# Patient Record
Sex: FEMALE | Race: WHITE | Employment: OTHER | URBAN - NONMETROPOLITAN AREA
[De-identification: names, ages, dates, MRNs, and addresses within clinical notes are randomized per-mention and may not be internally consistent; named-entity substitution may affect disease eponyms.]

---

## 2017-11-16 ENCOUNTER — HOSPITAL ENCOUNTER (INPATIENT)
Age: 67
LOS: 3 days | Discharge: HOME | DRG: 292 | End: 2017-11-19
Attending: EMERGENCY MEDICINE | Admitting: FAMILY MEDICINE
Payer: MEDICARE

## 2017-11-16 ENCOUNTER — APPOINTMENT (OUTPATIENT)
Dept: CT IMAGING | Age: 67
DRG: 292 | End: 2017-11-16
Payer: MEDICARE

## 2017-11-16 ENCOUNTER — APPOINTMENT (OUTPATIENT)
Dept: GENERAL RADIOLOGY | Age: 67
DRG: 292 | End: 2017-11-16
Payer: MEDICARE

## 2017-11-16 DIAGNOSIS — I50.9 CONGESTIVE HEART FAILURE, UNSPECIFIED CONGESTIVE HEART FAILURE CHRONICITY, UNSPECIFIED CONGESTIVE HEART FAILURE TYPE: ICD-10-CM

## 2017-11-16 DIAGNOSIS — I48.91 NEW ONSET A-FIB (HCC): Primary | ICD-10-CM

## 2017-11-16 LAB
-: ABNORMAL
ABSOLUTE EOS #: 0.3 K/UL (ref 0–0.4)
ABSOLUTE IMMATURE GRANULOCYTE: ABNORMAL K/UL (ref 0–0.3)
ABSOLUTE LYMPH #: 2.2 K/UL (ref 1–4.8)
ABSOLUTE MONO #: 0.7 K/UL (ref 0–1)
ALBUMIN SERPL-MCNC: 3.7 G/DL (ref 3.5–5.2)
ALBUMIN/GLOBULIN RATIO: 0.9 (ref 1–2.5)
ALP BLD-CCNC: 100 U/L (ref 35–104)
ALT SERPL-CCNC: 18 U/L (ref 5–33)
AMORPHOUS: ABNORMAL
ANION GAP SERPL CALCULATED.3IONS-SCNC: 11 MMOL/L (ref 9–17)
AST SERPL-CCNC: 20 U/L
BACTERIA: ABNORMAL
BASOPHILS # BLD: 1 %
BASOPHILS ABSOLUTE: 0.1 K/UL (ref 0–0.2)
BILIRUB SERPL-MCNC: 0.45 MG/DL (ref 0.3–1.2)
BILIRUBIN URINE: NEGATIVE
BNP INTERPRETATION: ABNORMAL
BUN BLDV-MCNC: 24 MG/DL (ref 8–23)
BUN/CREAT BLD: 26 (ref 9–20)
CALCIUM SERPL-MCNC: 9 MG/DL (ref 8.6–10.4)
CARBAMAZEPINE DATE LAST DOSE: ABNORMAL
CARBAMAZEPINE DOSE AMOUNT: ABNORMAL
CARBAMAZEPINE DOSE TIME: ABNORMAL
CARBAMAZEPINE LEVEL: 7.7 UG/ML (ref 8–12)
CASTS UA: ABNORMAL /LPF
CHLORIDE BLD-SCNC: 93 MMOL/L (ref 98–107)
CO2: 29 MMOL/L (ref 20–31)
COLOR: YELLOW
COMMENT UA: ABNORMAL
CREAT SERPL-MCNC: 0.93 MG/DL (ref 0.5–0.9)
CRYSTALS, UA: ABNORMAL /HPF
DIFFERENTIAL TYPE: ABNORMAL
EKG ATRIAL RATE: 64 BPM
EKG Q-T INTERVAL: 446 MS
EKG QRS DURATION: 82 MS
EKG QTC CALCULATION (BAZETT): 452 MS
EKG R AXIS: 71 DEGREES
EKG T AXIS: 92 DEGREES
EKG VENTRICULAR RATE: 62 BPM
EOSINOPHILS RELATIVE PERCENT: 3 %
EPITHELIAL CELLS UA: ABNORMAL /HPF (ref 0–25)
GFR AFRICAN AMERICAN: >60 ML/MIN
GFR NON-AFRICAN AMERICAN: 60 ML/MIN
GFR SERPL CREATININE-BSD FRML MDRD: ABNORMAL ML/MIN/{1.73_M2}
GFR SERPL CREATININE-BSD FRML MDRD: ABNORMAL ML/MIN/{1.73_M2}
GLUCOSE BLD-MCNC: 85 MG/DL (ref 70–99)
GLUCOSE URINE: NEGATIVE
HCT VFR BLD CALC: 38 % (ref 36–46)
HEMOGLOBIN: 12.4 G/DL (ref 12–16)
IMMATURE GRANULOCYTES: ABNORMAL %
KETONES, URINE: NEGATIVE
LEUKOCYTE ESTERASE, URINE: NEGATIVE
LYMPHOCYTES # BLD: 21 %
MCH RBC QN AUTO: 27.6 PG (ref 26–34)
MCHC RBC AUTO-ENTMCNC: 32.8 G/DL (ref 31–37)
MCV RBC AUTO: 84.4 FL (ref 80–100)
MONOCYTES # BLD: 7 %
MUCUS: ABNORMAL
NITRITE, URINE: NEGATIVE
OTHER OBSERVATIONS UA: ABNORMAL
PDW BLD-RTO: 16.7 % (ref 12.1–15.2)
PH UA: 6 (ref 5–9)
PLATELET # BLD: 152 K/UL (ref 140–450)
PLATELET ESTIMATE: ABNORMAL
PMV BLD AUTO: 9.4 FL (ref 6–12)
POTASSIUM SERPL-SCNC: 4.8 MMOL/L (ref 3.7–5.3)
PRO-BNP: 1059 PG/ML
PROTEIN UA: NEGATIVE
RBC # BLD: 4.5 M/UL (ref 4–5.2)
RBC # BLD: ABNORMAL 10*6/UL
RBC UA: ABNORMAL /HPF (ref 0–2)
RENAL EPITHELIAL, UA: ABNORMAL /HPF
SEG NEUTROPHILS: 68 %
SEGMENTED NEUTROPHILS ABSOLUTE COUNT: 7 K/UL (ref 1.8–7.7)
SODIUM BLD-SCNC: 133 MMOL/L (ref 135–144)
SPECIFIC GRAVITY UA: 1.01 (ref 1.01–1.02)
TOTAL PROTEIN: 7.6 G/DL (ref 6.4–8.3)
TRICHOMONAS: ABNORMAL
TROPONIN INTERP: NORMAL
TROPONIN T: <0.03 NG/ML
TURBIDITY: CLEAR
URINE HGB: ABNORMAL
UROBILINOGEN, URINE: NORMAL
WBC # BLD: 10.3 K/UL (ref 3.5–11)
WBC # BLD: ABNORMAL 10*3/UL
WBC UA: ABNORMAL /HPF (ref 0–5)
YEAST: ABNORMAL

## 2017-11-16 PROCEDURE — 1200000000 HC SEMI PRIVATE

## 2017-11-16 PROCEDURE — 85025 COMPLETE CBC W/AUTO DIFF WBC: CPT

## 2017-11-16 PROCEDURE — 6370000000 HC RX 637 (ALT 250 FOR IP): Performed by: EMERGENCY MEDICINE

## 2017-11-16 PROCEDURE — 84443 ASSAY THYROID STIM HORMONE: CPT

## 2017-11-16 PROCEDURE — 83880 ASSAY OF NATRIURETIC PEPTIDE: CPT

## 2017-11-16 PROCEDURE — 83036 HEMOGLOBIN GLYCOSYLATED A1C: CPT

## 2017-11-16 PROCEDURE — 96365 THER/PROPH/DIAG IV INF INIT: CPT

## 2017-11-16 PROCEDURE — 84484 ASSAY OF TROPONIN QUANT: CPT

## 2017-11-16 PROCEDURE — 6360000002 HC RX W HCPCS: Performed by: EMERGENCY MEDICINE

## 2017-11-16 PROCEDURE — 96375 TX/PRO/DX INJ NEW DRUG ADDON: CPT

## 2017-11-16 PROCEDURE — 82947 ASSAY GLUCOSE BLOOD QUANT: CPT

## 2017-11-16 PROCEDURE — 6360000004 HC RX CONTRAST MEDICATION: Performed by: EMERGENCY MEDICINE

## 2017-11-16 PROCEDURE — 81001 URINALYSIS AUTO W/SCOPE: CPT

## 2017-11-16 PROCEDURE — 71020 XR CHEST STANDARD TWO VW: CPT

## 2017-11-16 PROCEDURE — 71275 CT ANGIOGRAPHY CHEST: CPT

## 2017-11-16 PROCEDURE — 99285 EMERGENCY DEPT VISIT HI MDM: CPT

## 2017-11-16 PROCEDURE — 80053 COMPREHEN METABOLIC PANEL: CPT

## 2017-11-16 PROCEDURE — 96372 THER/PROPH/DIAG INJ SC/IM: CPT

## 2017-11-16 PROCEDURE — 2580000003 HC RX 258: Performed by: EMERGENCY MEDICINE

## 2017-11-16 PROCEDURE — 93005 ELECTROCARDIOGRAM TRACING: CPT

## 2017-11-16 PROCEDURE — 84439 ASSAY OF FREE THYROXINE: CPT

## 2017-11-16 PROCEDURE — 80156 ASSAY CARBAMAZEPINE TOTAL: CPT

## 2017-11-16 PROCEDURE — 73030 X-RAY EXAM OF SHOULDER: CPT

## 2017-11-16 RX ORDER — OXYCODONE HYDROCHLORIDE AND ACETAMINOPHEN 5; 325 MG/1; MG/1
1 TABLET ORAL ONCE
Status: COMPLETED | OUTPATIENT
Start: 2017-11-16 | End: 2017-11-16

## 2017-11-16 RX ORDER — ONDANSETRON 2 MG/ML
4 INJECTION INTRAMUSCULAR; INTRAVENOUS EVERY 6 HOURS PRN
Status: DISCONTINUED | OUTPATIENT
Start: 2017-11-16 | End: 2017-11-19 | Stop reason: HOSPADM

## 2017-11-16 RX ORDER — FUROSEMIDE 10 MG/ML
40 INJECTION INTRAMUSCULAR; INTRAVENOUS 2 TIMES DAILY
Status: DISCONTINUED | OUTPATIENT
Start: 2017-11-16 | End: 2017-11-19 | Stop reason: HOSPADM

## 2017-11-16 RX ORDER — CARBAMAZEPINE 100 MG/1
200 TABLET, EXTENDED RELEASE ORAL 2 TIMES DAILY
Status: DISCONTINUED | OUTPATIENT
Start: 2017-11-16 | End: 2017-11-19 | Stop reason: HOSPADM

## 2017-11-16 RX ORDER — FLUCONAZOLE 100 MG/1
200 TABLET ORAL ONCE
Status: COMPLETED | OUTPATIENT
Start: 2017-11-16 | End: 2017-11-16

## 2017-11-16 RX ORDER — DEXTROSE MONOHYDRATE 50 MG/ML
100 INJECTION, SOLUTION INTRAVENOUS PRN
Status: DISCONTINUED | OUTPATIENT
Start: 2017-11-16 | End: 2017-11-19 | Stop reason: HOSPADM

## 2017-11-16 RX ORDER — DEXTROSE MONOHYDRATE 25 G/50ML
12.5 INJECTION, SOLUTION INTRAVENOUS PRN
Status: DISCONTINUED | OUTPATIENT
Start: 2017-11-16 | End: 2017-11-19 | Stop reason: HOSPADM

## 2017-11-16 RX ORDER — SODIUM CHLORIDE 0.9 % (FLUSH) 0.9 %
10 SYRINGE (ML) INJECTION EVERY 12 HOURS SCHEDULED
Status: DISCONTINUED | OUTPATIENT
Start: 2017-11-16 | End: 2017-11-19 | Stop reason: HOSPADM

## 2017-11-16 RX ORDER — ACETAMINOPHEN 325 MG/1
650 TABLET ORAL EVERY 4 HOURS PRN
Status: DISCONTINUED | OUTPATIENT
Start: 2017-11-16 | End: 2017-11-19 | Stop reason: HOSPADM

## 2017-11-16 RX ORDER — ALBUTEROL SULFATE 90 UG/1
2 AEROSOL, METERED RESPIRATORY (INHALATION) EVERY 6 HOURS PRN
Status: DISCONTINUED | OUTPATIENT
Start: 2017-11-16 | End: 2017-11-19 | Stop reason: HOSPADM

## 2017-11-16 RX ORDER — NAPROXEN 500 MG/1
500 TABLET ORAL 2 TIMES DAILY PRN
Status: ON HOLD | COMMUNITY
End: 2017-11-19 | Stop reason: HOSPADM

## 2017-11-16 RX ORDER — FUROSEMIDE 20 MG/1
20 TABLET ORAL 2 TIMES DAILY
COMMUNITY

## 2017-11-16 RX ORDER — FUROSEMIDE 10 MG/ML
40 INJECTION INTRAMUSCULAR; INTRAVENOUS ONCE
Status: COMPLETED | OUTPATIENT
Start: 2017-11-16 | End: 2017-11-16

## 2017-11-16 RX ORDER — METOPROLOL TARTRATE 50 MG/1
50 TABLET, FILM COATED ORAL 2 TIMES DAILY
Status: DISCONTINUED | OUTPATIENT
Start: 2017-11-16 | End: 2017-11-19 | Stop reason: HOSPADM

## 2017-11-16 RX ORDER — NICOTINE POLACRILEX 4 MG
15 LOZENGE BUCCAL PRN
Status: DISCONTINUED | OUTPATIENT
Start: 2017-11-16 | End: 2017-11-19 | Stop reason: HOSPADM

## 2017-11-16 RX ORDER — FAMOTIDINE 20 MG/1
20 TABLET, FILM COATED ORAL 2 TIMES DAILY
Status: DISCONTINUED | OUTPATIENT
Start: 2017-11-16 | End: 2017-11-19 | Stop reason: HOSPADM

## 2017-11-16 RX ORDER — SODIUM CHLORIDE 0.9 % (FLUSH) 0.9 %
10 SYRINGE (ML) INJECTION PRN
Status: DISCONTINUED | OUTPATIENT
Start: 2017-11-16 | End: 2017-11-19 | Stop reason: HOSPADM

## 2017-11-16 RX ORDER — ALBUTEROL SULFATE 90 UG/1
2 AEROSOL, METERED RESPIRATORY (INHALATION) EVERY 6 HOURS PRN
COMMUNITY

## 2017-11-16 RX ORDER — GLIMEPIRIDE 4 MG/1
4 TABLET ORAL
COMMUNITY

## 2017-11-16 RX ORDER — BISOPROLOL FUMARATE AND HYDROCHLOROTHIAZIDE 10; 6.25 MG/1; MG/1
1 TABLET ORAL DAILY
COMMUNITY

## 2017-11-16 RX ORDER — GLIMEPIRIDE 4 MG/1
4 TABLET ORAL
Status: DISCONTINUED | OUTPATIENT
Start: 2017-11-17 | End: 2017-11-19 | Stop reason: HOSPADM

## 2017-11-16 RX ORDER — METOPROLOL TARTRATE 5 MG/5ML
5 INJECTION INTRAVENOUS EVERY 6 HOURS PRN
Status: DISCONTINUED | OUTPATIENT
Start: 2017-11-16 | End: 2017-11-19 | Stop reason: HOSPADM

## 2017-11-16 RX ORDER — MORPHINE SULFATE 2 MG/ML
4 INJECTION, SOLUTION INTRAMUSCULAR; INTRAVENOUS ONCE
Status: COMPLETED | OUTPATIENT
Start: 2017-11-16 | End: 2017-11-16

## 2017-11-16 RX ORDER — CARBAMAZEPINE 200 MG/1
200 TABLET, EXTENDED RELEASE ORAL 2 TIMES DAILY
COMMUNITY

## 2017-11-16 RX ADMIN — OXYCODONE HYDROCHLORIDE AND ACETAMINOPHEN 1 TABLET: 5; 325 TABLET ORAL at 20:24

## 2017-11-16 RX ADMIN — IOPAMIDOL 100 ML: 612 INJECTION, SOLUTION INTRAVENOUS at 19:28

## 2017-11-16 RX ADMIN — FUROSEMIDE 40 MG: 10 INJECTION, SOLUTION INTRAMUSCULAR; INTRAVENOUS at 21:57

## 2017-11-16 RX ADMIN — VANCOMYCIN HYDROCHLORIDE 1000 MG: 1 INJECTION, POWDER, LYOPHILIZED, FOR SOLUTION INTRAVENOUS at 19:40

## 2017-11-16 RX ADMIN — FLUCONAZOLE 200 MG: 100 TABLET ORAL at 18:20

## 2017-11-16 RX ADMIN — MORPHINE SULFATE 4 MG: 2 INJECTION, SOLUTION INTRAMUSCULAR; INTRAVENOUS at 18:49

## 2017-11-16 ASSESSMENT — PAIN DESCRIPTION - ONSET: ONSET: AWAKENED FROM SLEEP

## 2017-11-16 ASSESSMENT — PAIN DESCRIPTION - PAIN TYPE
TYPE: ACUTE PAIN
TYPE: CHRONIC PAIN

## 2017-11-16 ASSESSMENT — ENCOUNTER SYMPTOMS
ABDOMINAL PAIN: 0
DIARRHEA: 0
COUGH: 0
FACIAL SWELLING: 0
SORE THROAT: 0
TROUBLE SWALLOWING: 0
VOMITING: 0
NAUSEA: 0
BACK PAIN: 0
VOICE CHANGE: 0
CHEST TIGHTNESS: 0
PHOTOPHOBIA: 0
BLOOD IN STOOL: 0
WHEEZING: 0
SHORTNESS OF BREATH: 1

## 2017-11-16 ASSESSMENT — PAIN DESCRIPTION - DESCRIPTORS
DESCRIPTORS: ACHING;DISCOMFORT
DESCRIPTORS: ACHING;CONSTANT;CRAMPING

## 2017-11-16 ASSESSMENT — PAIN DESCRIPTION - FREQUENCY
FREQUENCY: CONTINUOUS
FREQUENCY: CONTINUOUS

## 2017-11-16 ASSESSMENT — PAIN DESCRIPTION - ORIENTATION
ORIENTATION: RIGHT
ORIENTATION: RIGHT

## 2017-11-16 ASSESSMENT — PAIN DESCRIPTION - LOCATION
LOCATION: SHOULDER
LOCATION: SHOULDER

## 2017-11-16 ASSESSMENT — PAIN SCALES - GENERAL
PAINLEVEL_OUTOF10: 7
PAINLEVEL_OUTOF10: 4
PAINLEVEL_OUTOF10: 8
PAINLEVEL_OUTOF10: 4

## 2017-11-16 NOTE — ED PROVIDER NOTES
deficit. She exhibits normal muscle tone. Coordination normal.   No nystagmus   Skin: Skin is warm and dry. No rash noted. She is not diaphoretic. No erythema. No pallor. Psychiatric: She has a normal mood and affect. Her behavior is normal. Thought content normal.   Nursing note and vitals reviewed. DIAGNOSTIC RESULTS     EKG: (none if blank)  All EKG's are interpreted by the Emergency Department Physician who either signs or Co-signs this chart in the absence of a cardiologist.      RADIOLOGY: (none if blank)   Interpretation per the Radiologist below, if available at the time of this note:    XR CHEST STANDARD (2 VW)    (Results Pending)   CTA CHEST W CONTRAST    (Results Pending)   XR SHOULDER RIGHT (MIN 2 VIEWS)    (Results Pending)       LABS:  Labs Reviewed   CBC WITH AUTO DIFFERENTIAL   COMPREHENSIVE METABOLIC PANEL   UA W/REFLEX CULTURE   TROPONIN   CARBAMAZEPINE LEVEL, TOTAL       All other labs were within normal range or not returned as of this dictation. EMERGENCY DEPARTMENT COURSE and Medical Decision Making:     MDM/  ED Course      Pt has new onset Afib, likely the source of her dyspnea. Will require admission for further eavluation. Case signed out to Dr Jeff Shafer at 1900hr shift change for follow up of CTA  I discussed case preliminarily with Dr Kayla Levi to let him know of the pt presentaiont    CONSULTS: (None if blank)  None    Procedures: (None if blank)       CLINICAL IMPRESSION    No diagnosis found. DISPOSITION/PLAN   DISPOSITION     PATIENT REFERRED TO:  No follow-up provider specified.     DISCHARGE MEDICATIONS:  New Prescriptions    No medications on file              (Please note that portions of this note were completed with a voice recognition program.  Efforts were made to edit the dictations but occasionally words are mis-transcribed.)      Gianfranco Hall DO, JANETT (electronically signed)  Attending Emergency Physician         Pattie Blanco DO  11/20/17 1117

## 2017-11-17 ENCOUNTER — APPOINTMENT (OUTPATIENT)
Dept: ULTRASOUND IMAGING | Age: 67
DRG: 292 | End: 2017-11-17
Payer: MEDICARE

## 2017-11-17 PROBLEM — I10 ESSENTIAL HYPERTENSION: Status: ACTIVE | Noted: 2017-11-16

## 2017-11-17 PROBLEM — D68.00 VON WILLEBRAND DISEASE: Status: ACTIVE | Noted: 2017-11-17

## 2017-11-17 PROBLEM — I50.31 ACUTE DIASTOLIC HEART FAILURE (HCC): Status: ACTIVE | Noted: 2017-11-16

## 2017-11-17 LAB
ABSOLUTE EOS #: 0.2 K/UL (ref 0–0.4)
ABSOLUTE IMMATURE GRANULOCYTE: ABNORMAL K/UL (ref 0–0.3)
ABSOLUTE LYMPH #: 1.4 K/UL (ref 1–4.8)
ABSOLUTE MONO #: 0.5 K/UL (ref 0–1)
ALBUMIN SERPL-MCNC: 3.3 G/DL (ref 3.5–5.2)
ALBUMIN/GLOBULIN RATIO: 0.9 (ref 1–2.5)
ALP BLD-CCNC: 105 U/L (ref 35–104)
ALT SERPL-CCNC: 40 U/L (ref 5–33)
ANION GAP SERPL CALCULATED.3IONS-SCNC: 13 MMOL/L (ref 9–17)
AST SERPL-CCNC: 67 U/L
BASOPHILS # BLD: 0 %
BASOPHILS ABSOLUTE: 0 K/UL (ref 0–0.2)
BILIRUB SERPL-MCNC: 0.67 MG/DL (ref 0.3–1.2)
BNP INTERPRETATION: ABNORMAL
BUN BLDV-MCNC: 23 MG/DL (ref 8–23)
BUN/CREAT BLD: 26 (ref 9–20)
CALCIUM SERPL-MCNC: 8.8 MG/DL (ref 8.6–10.4)
CHLORIDE BLD-SCNC: 96 MMOL/L (ref 98–107)
CO2: 27 MMOL/L (ref 20–31)
COLLAGEN ADENOSINE-5'-DIPHOSPHATE (ADP) TIME: 91 SEC (ref 67–112)
COLLAGEN EPINEPHRINE TIME: 195 SEC (ref 85–172)
CREAT SERPL-MCNC: 0.89 MG/DL (ref 0.5–0.9)
DIFFERENTIAL TYPE: ABNORMAL
EOSINOPHILS RELATIVE PERCENT: 2 %
ESTIMATED AVERAGE GLUCOSE: 151 MG/DL
GFR AFRICAN AMERICAN: >60 ML/MIN
GFR NON-AFRICAN AMERICAN: >60 ML/MIN
GFR SERPL CREATININE-BSD FRML MDRD: ABNORMAL ML/MIN/{1.73_M2}
GFR SERPL CREATININE-BSD FRML MDRD: ABNORMAL ML/MIN/{1.73_M2}
GLUCOSE BLD-MCNC: 131 MG/DL (ref 74–100)
GLUCOSE BLD-MCNC: 136 MG/DL (ref 70–99)
GLUCOSE BLD-MCNC: 157 MG/DL (ref 74–100)
GLUCOSE BLD-MCNC: 167 MG/DL (ref 74–100)
GLUCOSE BLD-MCNC: 181 MG/DL (ref 74–100)
GLUCOSE BLD-MCNC: 251 MG/DL (ref 74–100)
GLUCOSE BLD-MCNC: 275 MG/DL (ref 74–100)
HBA1C MFR BLD: 6.9 % (ref 4.8–5.9)
HCT VFR BLD CALC: 36.7 % (ref 36–46)
HEMOGLOBIN: 11.8 G/DL (ref 12–16)
IMMATURE GRANULOCYTES: ABNORMAL %
LV EF: 55 %
LVEF MODALITY: NORMAL
LYMPHOCYTES # BLD: 18 %
MAGNESIUM: 2.1 MG/DL (ref 1.6–2.6)
MCH RBC QN AUTO: 27.5 PG (ref 26–34)
MCHC RBC AUTO-ENTMCNC: 32.2 G/DL (ref 31–37)
MCV RBC AUTO: 85.4 FL (ref 80–100)
MONOCYTES # BLD: 6 %
PDW BLD-RTO: 16.6 % (ref 12.1–15.2)
PLATELET # BLD: 134 K/UL (ref 140–450)
PLATELET ESTIMATE: ABNORMAL
PLATELET FUNCTION INTERP: ABNORMAL
PMV BLD AUTO: 10.5 FL (ref 6–12)
POTASSIUM SERPL-SCNC: 4.2 MMOL/L (ref 3.7–5.3)
PRO-BNP: 970 PG/ML
RBC # BLD: 4.29 M/UL (ref 4–5.2)
RBC # BLD: ABNORMAL 10*6/UL
SEG NEUTROPHILS: 74 %
SEGMENTED NEUTROPHILS ABSOLUTE COUNT: 5.6 K/UL (ref 1.8–7.7)
SODIUM BLD-SCNC: 136 MMOL/L (ref 135–144)
THYROXINE, FREE: 4.91 NG/DL (ref 0.93–1.7)
TOTAL PROTEIN: 6.9 G/DL (ref 6.4–8.3)
TROPONIN INTERP: NORMAL
TROPONIN T: <0.03 NG/ML
TSH SERPL DL<=0.05 MIU/L-ACNC: 2.43 MIU/L (ref 0.3–5)
WBC # BLD: 7.7 K/UL (ref 3.5–11)
WBC # BLD: ABNORMAL 10*3/UL

## 2017-11-17 PROCEDURE — 93306 TTE W/DOPPLER COMPLETE: CPT

## 2017-11-17 PROCEDURE — 6370000000 HC RX 637 (ALT 250 FOR IP): Performed by: FAMILY MEDICINE

## 2017-11-17 PROCEDURE — 2500000003 HC RX 250 WO HCPCS: Performed by: RADIOLOGY

## 2017-11-17 PROCEDURE — 6360000002 HC RX W HCPCS: Performed by: FAMILY MEDICINE

## 2017-11-17 PROCEDURE — 80053 COMPREHEN METABOLIC PANEL: CPT

## 2017-11-17 PROCEDURE — 85025 COMPLETE CBC W/AUTO DIFF WBC: CPT

## 2017-11-17 PROCEDURE — 99223 1ST HOSP IP/OBS HIGH 75: CPT | Performed by: FAMILY MEDICINE

## 2017-11-17 PROCEDURE — 94664 DEMO&/EVAL PT USE INHALER: CPT

## 2017-11-17 PROCEDURE — 83735 ASSAY OF MAGNESIUM: CPT

## 2017-11-17 PROCEDURE — 2580000003 HC RX 258: Performed by: FAMILY MEDICINE

## 2017-11-17 PROCEDURE — 85245 CLOT FACTOR VIII VW RISTOCTN: CPT

## 2017-11-17 PROCEDURE — 82947 ASSAY GLUCOSE BLOOD QUANT: CPT

## 2017-11-17 PROCEDURE — 85576 BLOOD PLATELET AGGREGATION: CPT

## 2017-11-17 PROCEDURE — 36415 COLL VENOUS BLD VENIPUNCTURE: CPT

## 2017-11-17 PROCEDURE — 1200000000 HC SEMI PRIVATE

## 2017-11-17 PROCEDURE — 94640 AIRWAY INHALATION TREATMENT: CPT

## 2017-11-17 PROCEDURE — 0W9B3ZZ DRAINAGE OF LEFT PLEURAL CAVITY, PERCUTANEOUS APPROACH: ICD-10-PCS | Performed by: RADIOLOGY

## 2017-11-17 PROCEDURE — 32555 ASPIRATE PLEURA W/ IMAGING: CPT

## 2017-11-17 PROCEDURE — 85246 CLOT FACTOR VIII VW ANTIGEN: CPT

## 2017-11-17 RX ORDER — ALPRAZOLAM 0.25 MG/1
0.25 TABLET ORAL
Status: DISCONTINUED | OUTPATIENT
Start: 2017-11-17 | End: 2017-11-19 | Stop reason: HOSPADM

## 2017-11-17 RX ORDER — KETOROLAC TROMETHAMINE 15 MG/ML
15 INJECTION, SOLUTION INTRAMUSCULAR; INTRAVENOUS ONCE
Status: COMPLETED | OUTPATIENT
Start: 2017-11-17 | End: 2017-11-17

## 2017-11-17 RX ORDER — ALPRAZOLAM 0.25 MG/1
0.25 TABLET ORAL
COMMUNITY

## 2017-11-17 RX ORDER — LIDOCAINE HYDROCHLORIDE 20 MG/ML
INJECTION, SOLUTION INFILTRATION; PERINEURAL
Status: COMPLETED | OUTPATIENT
Start: 2017-11-17 | End: 2017-11-17

## 2017-11-17 RX ADMIN — INSULIN LISPRO 1 UNITS: 100 INJECTION, SOLUTION INTRAVENOUS; SUBCUTANEOUS at 21:17

## 2017-11-17 RX ADMIN — GLIMEPIRIDE 4 MG: 4 TABLET ORAL at 08:01

## 2017-11-17 RX ADMIN — ALBUTEROL SULFATE 2 PUFF: 90 AEROSOL, METERED RESPIRATORY (INHALATION) at 11:42

## 2017-11-17 RX ADMIN — KETOROLAC TROMETHAMINE 15 MG: 15 INJECTION, SOLUTION INTRAMUSCULAR; INTRAVENOUS at 14:23

## 2017-11-17 RX ADMIN — FUROSEMIDE 40 MG: 10 INJECTION, SOLUTION INTRAMUSCULAR; INTRAVENOUS at 16:56

## 2017-11-17 RX ADMIN — ACETAMINOPHEN 650 MG: 325 TABLET, FILM COATED ORAL at 21:16

## 2017-11-17 RX ADMIN — Medication 10 ML: at 11:11

## 2017-11-17 RX ADMIN — SERTRALINE HYDROCHLORIDE 0.25 MG: 50 TABLET ORAL at 12:15

## 2017-11-17 RX ADMIN — METOPROLOL TARTRATE 50 MG: 50 TABLET ORAL at 11:10

## 2017-11-17 RX ADMIN — FAMOTIDINE 20 MG: 20 TABLET, FILM COATED ORAL at 21:17

## 2017-11-17 RX ADMIN — METOPROLOL TARTRATE 50 MG: 50 TABLET ORAL at 00:01

## 2017-11-17 RX ADMIN — Medication 10 ML: at 21:18

## 2017-11-17 RX ADMIN — CARBAMAZEPINE 200 MG: 100 TABLET, EXTENDED RELEASE ORAL at 11:10

## 2017-11-17 RX ADMIN — INSULIN LISPRO 3 UNITS: 100 INJECTION, SOLUTION INTRAVENOUS; SUBCUTANEOUS at 12:15

## 2017-11-17 RX ADMIN — APIXABAN 5 MG: 5 TABLET, FILM COATED ORAL at 00:01

## 2017-11-17 RX ADMIN — ACETAMINOPHEN 650 MG: 325 TABLET, FILM COATED ORAL at 08:03

## 2017-11-17 RX ADMIN — ALBUTEROL SULFATE 2 PUFF: 90 AEROSOL, METERED RESPIRATORY (INHALATION) at 19:37

## 2017-11-17 RX ADMIN — Medication 10 ML: at 00:31

## 2017-11-17 RX ADMIN — METOPROLOL TARTRATE 50 MG: 50 TABLET ORAL at 21:17

## 2017-11-17 RX ADMIN — LIDOCAINE HYDROCHLORIDE 10 ML: 20 INJECTION, SOLUTION INFILTRATION; PERINEURAL at 14:03

## 2017-11-17 RX ADMIN — CARBAMAZEPINE 200 MG: 100 TABLET, EXTENDED RELEASE ORAL at 14:00

## 2017-11-17 RX ADMIN — FAMOTIDINE 20 MG: 20 TABLET, FILM COATED ORAL at 00:00

## 2017-11-17 RX ADMIN — FUROSEMIDE 40 MG: 10 INJECTION, SOLUTION INTRAMUSCULAR; INTRAVENOUS at 11:11

## 2017-11-17 RX ADMIN — Medication 10 ML: at 16:56

## 2017-11-17 RX ADMIN — INSULIN LISPRO 1 UNITS: 100 INJECTION, SOLUTION INTRAVENOUS; SUBCUTANEOUS at 16:55

## 2017-11-17 RX ADMIN — FAMOTIDINE 20 MG: 20 TABLET, FILM COATED ORAL at 11:10

## 2017-11-17 ASSESSMENT — PAIN DESCRIPTION - PAIN TYPE
TYPE: CHRONIC PAIN
TYPE: ACUTE PAIN

## 2017-11-17 ASSESSMENT — PAIN SCALES - GENERAL
PAINLEVEL_OUTOF10: 0
PAINLEVEL_OUTOF10: 4
PAINLEVEL_OUTOF10: 4
PAINLEVEL_OUTOF10: 8
PAINLEVEL_OUTOF10: 7
PAINLEVEL_OUTOF10: 5

## 2017-11-17 ASSESSMENT — PAIN DESCRIPTION - FREQUENCY
FREQUENCY: CONTINUOUS

## 2017-11-17 ASSESSMENT — PAIN DESCRIPTION - ORIENTATION
ORIENTATION: LEFT
ORIENTATION: RIGHT;UPPER
ORIENTATION: LEFT
ORIENTATION: RIGHT

## 2017-11-17 ASSESSMENT — PAIN DESCRIPTION - PROGRESSION
CLINICAL_PROGRESSION: RAPIDLY WORSENING
CLINICAL_PROGRESSION: GRADUALLY IMPROVING
CLINICAL_PROGRESSION: GRADUALLY WORSENING

## 2017-11-17 ASSESSMENT — PAIN DESCRIPTION - DESCRIPTORS
DESCRIPTORS: SHARP
DESCRIPTORS: SHARP
DESCRIPTORS: ACHING;CONSTANT;DISCOMFORT

## 2017-11-17 ASSESSMENT — PAIN DESCRIPTION - LOCATION
LOCATION: SHOULDER
LOCATION: CHEST
LOCATION: SHOULDER
LOCATION: CHEST

## 2017-11-17 ASSESSMENT — PAIN DESCRIPTION - ONSET
ONSET: ON-GOING

## 2017-11-17 NOTE — PROGRESS NOTES
RESPIRATORY ASSESSMENT PROTOCOL                                                                                              Patient Name: Violeta Austin Room#: [id number] : 1950     Admitting diagnosis: Atrial fibrillation ([facility] [I48.91]  Atrial fibrillation, new onset ([facility] [I48.91]       Medical History:   Past Medical History:   Diagnosis Date    Asthma     Cancer ([facility]     breast    Cerebral artery occlusion with cerebral infarction ([facility]     Diabetes mellitus ([facility]     Hypertension        PATIENT ASSESSMENT    LABORATORY DATA  Hematology:   Lab Results   Component Value Date    WBC 10.3 2017    RBC 4.50 2017    HGB 12.4 2017    HCT 38.0 2017     2017     Chemistry:  No results found for: PHART, FIR1TXI, PO2ART, J5QVIGGS, KZG6NYQ, PBEA    Blood Culture:   Sputum Culture:     VITALS  Pulse: 70   Resp: 24  BP: (!) 151/96  SpO2: 94 % O2 Device: None (Room air)  Temp: 97.4 °F (36.3 °C)  Comment:   SKIN COLOR  [x] Normal  [] Pale  [] Dusky  [] Cyanotic      RESPIRATORY PATTERN  [x] Normal  [] Dyspnea  [] Cheyne-Ayers  [] Kussmaul  [] Biots  AMBULATORY  [] Yes  [] No  [x] With Assistance    PEAK FLOW  Predicted:     Personal Best:        VITAL CAPACITY  Predicted value:  ml  Actual Value:  ml  30% of Predicted:  ml  Patient Acuity 0 1 2 3 4 Score   Level of Concious (LOC) [x]  Alert & Oriented or Pt normal LOC []  Confused;follows directions []  Confused & uncooper-ative []  Obtunded []  Comatose 0   Respiratory Rate  (RR) [x]  Reg. rate & pattern. 12 - 20 bpm  []  Increased RR. Greater than 20 bpm   []  SOB w/ exertion or RR greater than 24 bpm []  Access- ory muscle use at rest. Abn.  resp. []  SOB at rest.   0   Bilateral Breath Sounds (BBS) [x]  Clear []  Diminish-ed bases  []  Diminish-ed t/o, or rales   []  Sporadic, scattered wheezes or rhonchi []  Persistentwheezes and, or absent BBS 0   Cough [x]  Strong, effective, & non-prod.  []  Effective & prod. Less than 25 ml (2 TBSP) over past 24 hrs []  Ineffective & non-prod to less than 25 ML over past 24 hrs []  Ineffective and, or greater than 25 ml sputum prod. past 24 hrs. []  Nonspon- taneous; Requires suctioning 0   Pulmonary History  (PULM HX) []  No smoking and no chronic pulmonaryhistory [x]  Former smoker. Quit over 12 mos. ago []  Current smoker or quit w/ in 12 mos []  Pulm. History and, or 20 pk/yr smoking hx []  Admitted w/ acute pulm. dx and, or has been admitted w/ pulm. dx 2 or more times over past 12 mos 1   Surgical History this Admit  (SURG HX) [x]  No surgery []  General surgery []  Lower abdominal []  Thoracic or upper abdominal   []  Thoracic w/ pulm. disease 0   Chest X-Ray (CXR)/CT Scan []  Clear or not applicable []  Not available [x]  Atelect- asis or pleural effusions []  Localized infiltrate or pulm. edema []  Con-solidated Infiltrates, bilateral, or in more than 1 lobe 2   Slow or Forced VC, FEV1 OR PEFR (PULM FXN)  [x]  80% or greater, or not indicated []  Pt. unable to perform []  FEV1 or PEFR or VC 51-79%. []  FEV1 or PEFR or VC  30-49%   []  FEV1 or PEFR or VC less than 30%   0   TOTAL ACUITY: 3       CARE PLAN    If Acuity Level is 2, 3, or 4 in any of the following:    [] BILATERAL BREATH SOUNDS (BBS)     [] PULMONARY HISTORY (PULM HX)  [] PULMONARY FUNCTION (PULM FX)    Goal: Improve respiratory functions in patients with airway disease and decrease WOB    [x] AEROSOL PROTOCOL    Total Acuity:   16-32  []  Secondary Assessment in 24 hrs Total Acuity:  9-15  []  Secondary Assessment in 24 hrs Total Acuity:  4-8  []  Secondary Assessment in 48 hrs Total Acuity:  0-3  [x]  Secondary Assessment in 72 hrs   HHN AEROSOL THERAPY with  [physician-ordered bronchodilator(s)] q 4 & Albuterol PRN q2 hrs. Breath-Actuated Neb if BBS Acuity = 4, and pt. can use MP. Notify physician if condition deteriorates.   HHN AEROSOL THERAPY with  [physician-ordered bronchodilator(s)]  QID and

## 2017-11-17 NOTE — ED PROVIDER NOTES
[facility] ED  Emergency Department     Care of Sergei Roland was assumed from Dr. Ann Garcia. Time of signout is 2000. The patient's initial evaluation and plan have been discussed with the prior provider who initially evaluated the patient . All pertinent data has been reviewed. This patient is a 69 y.o. Female presents with shortness of breath and increased lower extremity swelling. States she cannot walk around the house without shortness of breath and has to stop often. This is new. Also states she has not been taking her diuretics as prescribed. Has been feeling weak lately. Also complains of right shoulder pain. Patient denies fever, chills, headache, visual changes, neck pain, lightheadedness, abdominal pain, nausea, vomiting, diarrhea, or dysuria. On my examination, the patient resting in bed comfortably. She is morbidly obese  HEENT: WNL  Lungs: diminished breath sounds, No increased work of breathing or respiratory distress.   Heart: Heart rate normal and irregularly irregular, no murmurs clicks or gallops  Abdomen: Soft, nondistended, nontender   Lower extremities: 3+ pitting edema of bilateral lower extremities, negative Homans bilaterally  Neuro: Awake and alert, no lateralizing deficits, cranial nerves II through XII grossly intact bilaterally    EMERGENCY DEPARTMENT COURSE / MEDICAL DECISION MAKING:       MEDICATIONS GIVEN:  Orders Placed This Encounter   Medications    fluconazole (DIFLUCAN) tablet 200 mg    vancomycin 1000 mg IVPB in 250 mL D5W addavial    DISCONTD: enoxaparin (LOVENOX) injection 1 mg/kg    morphine (PF) injection 4 mg    DISCONTD: enoxaparin (LOVENOX) injection 110 mg    iopamidol (ISOVUE-300) 61 % injection 100 mL    oxyCODONE-acetaminophen (PERCOCET) 5-325 MG per tablet 1 tablet    furosemide (LASIX) injection 40 mg     LABS / RADIOLOGY:     Results for orders placed or performed during the hospital encounter of 11/16/17   CBC Auto Differential   Result Value Ref Range    WBC 10.3 3.5 - 11.0 k/uL    RBC 4.50 4.0 - 5.2 m/uL    Hemoglobin 12.4 12.0 - 16.0 g/dL    Hematocrit 38.0 36 - 46 %    MCV 84.4 80 - 100 fL    MCH 27.6 26 - 34 pg    MCHC 32.8 31 - 37 g/dL    RDW 16.7 (H) 12.1 - 15.2 %    Platelets 657 445 - 388 k/uL    MPV 9.4 6.0 - 12.0 fL    Differential Type NOT REPORTED     Seg Neutrophils 68 %    Lymphocytes 21 %    Monocytes 7 %    Eosinophils % 3 %    Basophils 1 %    Immature Granulocytes NOT REPORTED 0 %    Segs Absolute 7.00 1.8 - 7.7 k/uL    Absolute Lymph # 2.20 1.0 - 4.8 k/uL    Absolute Mono # 0.70 0.0 - 1.0 k/uL    Absolute Eos # 0.30 0.0 - 0.4 k/uL    Basophils # 0.10 0.0 - 0.2 k/uL    Absolute Immature Granulocyte NOT REPORTED 0.00 - 0.30 k/uL    WBC Morphology NOT REPORTED     RBC Morphology NOT REPORTED     Platelet Estimate NOT REPORTED    Comprehensive Metabolic Panel   Result Value Ref Range    Glucose 85 70 - 99 mg/dL    BUN 24 (H) 8 - 23 mg/dL    CREATININE 0.93 (H) 0.50 - 0.90 mg/dL    Bun/Cre Ratio 26 (H) 9 - 20    Calcium 9.0 8.6 - 10.4 mg/dL    Sodium 133 (L) 135 - 144 mmol/L    Potassium 4.8 3.7 - 5.3 mmol/L    Chloride 93 (L) 98 - 107 mmol/L    CO2 29 20 - 31 mmol/L    Anion Gap 11 9 - 17 mmol/L    Alkaline Phosphatase 100 35 - 104 U/L    ALT 18 5 - 33 U/L    AST 20 <32 U/L    Total Bilirubin 0.45 0.3 - 1.2 mg/dL    Total Protein 7.6 6.4 - 8.3 g/dL    Alb 3.7 3.5 - 5.2 g/dL    Albumin/Globulin Ratio 0.9 (L) 1.0 - 2.5    GFR Non-African American 60 (L) >60 mL/min    GFR African American >60 >60 mL/min    GFR Comment          GFR Staging         UA W/ Reflex Culture   Result Value Ref Range    Color, UA YELLOW YEL    Turbidity UA CLEAR CLEAR    Glucose, Ur NEGATIVE NEG    Bilirubin Urine NEGATIVE NEG    Ketones, Urine NEGATIVE NEG    Specific Gravity, UA 1.015 1.010 - 1.020    Urine Hgb TRACE (A) NEG    pH, UA 6.0 5.0 - 9.0    Protein, UA NEGATIVE NEG    Urobilinogen, Urine Normal NORM    Nitrite, Urine NEGATIVE NEG    Leukocyte Esterase, Urine NEGATIVE NEG    Urinalysis Comments NOT REPORTED    Troponin   Result Value Ref Range    Troponin T <0.03 <0.03 ng/mL    Troponin Interp         Carbamazepine level, total   Result Value Ref Range    Carbamazepine Lvl 7.7 (L) 8.0 - 12.0 ug/mL    Carbamazepine Dose Amount NOT REPORTED     Carbamazepine Date Last Dose NOT REPORTED     Carbamazepine Dose Time NOT REPORTED    Brain Natriuretic Peptide   Result Value Ref Range    Pro-BNP 1,059 (H) <300 pg/mL    BNP Interpretation         Microscopic Urinalysis   Result Value Ref Range    -          WBC, UA 0 TO 2 0 - 5 /HPF    RBC, UA 0 TO 2 0 - 2 /HPF    Casts UA HYALINE /LPF    Crystals UA NOT REPORTED NONE /HPF    Epithelial Cells UA 0 TO 2 0 - 25 /HPF    Renal Epithelial, Urine NOT REPORTED 0 /HPF    Bacteria, UA TRACE (A) NONE    Mucus, UA NOT REPORTED NONE    Trichomonas, UA NOT REPORTED NONE    Amorphous, UA NOT REPORTED NONE    Other Observations UA NOT REPORTED NREQ    Yeast, UA NOT REPORTED NONE   EKG 12 Lead   Result Value Ref Range    Ventricular Rate 62 BPM    Atrial Rate 64 BPM    QRS Duration 82 ms    Q-T Interval 446 ms    QTc Calculation (Bazett) 452 ms    R Axis 71 degrees    T Axis 92 degrees     Xr Chest Standard (2 Vw)    Result Date: 11/16/2017  FINAL REPORT EXAM:  XR CHEST STANDARD TWO VW HISTORY:  Chest Pain TECHNIQUE:  PA and lateral chest PRIORS:  None. FINDINGS:  The heart and vascularity are normal. There is a large left pleural effusion present. The right lung appears well expanded. There are no other pulmonary infiltrates or masses seen. The osseous structures are grossly maintained. Impression:  Large left pleural effusion. No other acute pulmonary findings.  Electronically Signed By: Saji Thurman   on  11/16/2017  20:09    Xr Shoulder Right (min 2 Views)    Result Date: 11/16/2017  FINAL REPORT EXAM:  XR SHOULDER RIGHT STANDARD HISTORY:  R shoulder pain TECHNIQUE:  Four views of the right shoulder were for shortness of breath. She is afebrile, nontoxic, hemodynamically stable. She was worked up by the initial provider. EKG showing new onset atrial fibrillation. Laboratory studies were grossly unremarkable. Chest x-ray showing a large left pleural effusion. Shoulder x-ray showing posttraumatic change from her previous fracture. CT of the chest negative for pulmonary embolism but does confirm the large left pleural effusion. The initial provider placed a dose of therapeutic Lovenox for her new onset atrial fibrillation. However the patient refused this. I gave the patient a dose of Lasix 40 mg IV for heart failure and pleural effusion. Spoke to Dr. Raegan Wang who will admit the patient for further workup. CLINICAL IMPRESSION      1. New onset a-fib ([address]    2.  Congestive heart failure, unspecified congestive heart failure chronicity, unspecified congestive heart failure type [facility])          DISPOSITION/PLAN   DISPOSITION Admitted    PATIENT REFERRED TO:  Brianna Marshall MD  [address]  [phone]            DISCHARGE MEDICATIONS:  New Prescriptions    No medications on file              (Please note that portions of this note were completed with a voice recognition program.  Efforts were made to edit the dictations but occasionally words are mis-transcribed.)      Isac Calderon MD (electronically signed)  Attending Emergency Physician          Isac Calderon MD  11/17/17 9100

## 2017-11-17 NOTE — PROGRESS NOTES
Nutrition Assessment    Type and Reason for Visit: Initial    Nutrition Recommendations:   Modify current diet to CC 4 carbs per meal with cardiac diet. F/u with cardiac and CC diet education needs. Malnutrition Assessment:  · Malnutrition Status: Insufficient data  · Context: Acute illness or injury  · Findings of the 6 clinical characteristics of malnutrition (Minimum of 2 out of 6 clinical characteristics is required to make the diagnosis of moderate or severe Protein Calorie Malnutrition based on AND/ASPEN Guidelines):  1. Energy Intake-Not available,      2. Weight Loss-No significant weight loss,    3. Fat Loss-Unable to assess,    4. Muscle Loss-Unable to assess,    5. Fluid Accumulation-Severe fluid accumulation, Extremities (R/L LE + 4 pitting)  6.  Strength-Not measured    Nutrition Diagnosis:   · Problem: Altered nutrition-related lab values  · Etiology: related to Endocrine dysfunction     Signs and symptoms:  as evidenced by Lab values (A1c6.9)    Nutrition Assessment:  · Subjective Assessment: None. Pt not in her room at attempted visit.   · Nutrition-Focused Physical Findings: unable to assess  · Wound Type: None  · Current Nutrition Therapies:  · Oral Diet Orders: Cardiac   · Oral Diet intake: %, Select  · Oral Nutrition Supplement (ONS) Orders: None  · ONS intake:    · Anthropometric Measures:  · Ht: 5' 5\" (165.1 cm)   · Current Body Wt: 295 lb (133.8 kg)  · Admission Body Wt: 295 lb (133.8 kg)  · Usual Body Wt:  (unknown)  · Ideal Body Wt: 125 lb (56.7 kg), % Ideal Body 168#  · BMI Classification: BMI > or equal to 40.0 Obese Class III  Wt Readings from Last 10 Encounters:   11/16/17 295 lb (133.8 kg)     Recent Labs      11/16/17   1755  11/17/17   0612   NA  133*  136   K  4.8  4.2   CL  93*  96*   CO2  29  27   BUN  24*  23   CREATININE  0.93*  0.89   GLUCOSE  85  136*   ALT  18  40*   ALKPHOS  100  105*   GFR                              Lab Results   Component Value Date

## 2017-11-17 NOTE — PLAN OF CARE
Informed by Maggie Chun that patient took \"Tylenol\" out of her purse and ingested it. Stated patient told the [facility] that she \"always takes it at 2 o'clock. \" Patient was leaving unit for thoracentesis as RN was informed of this. Will educate patient when she returns that NO home medications should be taken with out discussing with physician or nursing staff. When patient returns from thoracentesis, will ask her to removed all medications from purse and lock them in the cupboard. If patient is unwilling to remove medications from purse, will lock entire purse and continents in locked cupboard.      Brandon Murphy, RN

## 2017-11-17 NOTE — ED NOTES
Dr. Chace Ricci returned call.   Currently speaking with [employer], [address] [facility],[address] [place]  11/16/17 2059

## 2017-11-17 NOTE — PLAN OF CARE
Dr. Pasquale Westfall notified of consult. Instructed to hold Eliquis until he could research patient chart more and speak with her PCP.  Kylee Millan RN

## 2017-11-17 NOTE — PROGRESS NOTES
Patient very hesitant and upset about new medications being given to her at this time. Educated about atrial fib and the medications being given to her for this. Patient states, \"I am not here for my heart, I don't know why everyone is saying this! I came in for my right shoulder! \" Sat down with patient and explained that she was not admitted for her shoulder but for her heart. Patient becomes very upset and keeps repeating herself about her pills and how she does not take eliquis or any other night time pills. Writer spent almost an hour in with patient at this time. Patient finally agreed to take medications at this time. Refuses insulin and cream for rash patient states, \"I already got medicine for my rash and I don't take insulin. \" Educated patient on cream and insulin but still refuses. Troponin francisco at this time. Patient repositioned in bed and bed alarm on at this time. Will continue to monitor.

## 2017-11-17 NOTE — ED NOTES
Informed by [address] that room was not ready. Nurse to call once patient can be transferred to the floor.      Jon Rangel RN  11/16/17 9115

## 2017-11-17 NOTE — H&P
11/17/2017     (L) 11/17/2017       Lab Results   Component Value Date    BUN 23 11/17/2017    CREATININE 0.89 11/17/2017     11/17/2017    K 4.2 11/17/2017    CALCIUM 8.8 11/17/2017    CL 96 (L) 11/17/2017    CO2 27 11/17/2017    LABGLOM >60 11/17/2017       Lab Results   Component Value Date    WBCUA 0 TO 2 11/16/2017    RBCUA 0 TO 2 11/16/2017    EPITHUA 0 TO 2 11/16/2017    LEUKOCYTESUR NEGATIVE 11/16/2017    SPECGRAV 1.015 11/16/2017    GLUCOSEU NEGATIVE 11/16/2017    KETUA NEGATIVE 11/16/2017    PROTEINU NEGATIVE 11/16/2017    HGBUR TRACE (A) 11/16/2017    CASTUA HYALINE 11/16/2017    CRYSTUA NOT REPORTED 11/16/2017    BACTERIA TRACE (A) 11/16/2017    YEAST NOT REPORTED 11/16/2017       Lab Results   Component Value Date    TROPONINT <0.03 11/17/2017    PROBNP 970 (H) 11/17/2017       Xr Chest Standard (2 Vw)    Result Date: 11/16/2017  FINAL REPORT EXAM:  XR CHEST STANDARD TWO VW HISTORY:  Chest Pain TECHNIQUE:  PA and lateral chest PRIORS:  None. FINDINGS:  The heart and vascularity are normal. There is a large left pleural effusion present. The right lung appears well expanded. There are no other pulmonary infiltrates or masses seen. The osseous structures are grossly maintained. Impression:  Large left pleural effusion. No other acute pulmonary findings. Electronically Signed By: Amilcar Rangel   on  11/16/2017  20:09    Xr Shoulder Right (min 2 Views)    Result Date: 11/16/2017  FINAL REPORT EXAM:  XR SHOULDER RIGHT STANDARD HISTORY:  R shoulder pain TECHNIQUE:  Four views of the right shoulder were obtained. PRIORS:  None. FINDINGS:  Marked arthritic changes seen in the right shoulder with marked deformity in the humeral head. There is marked narrowing of the subacromial space which is typical of rotator cuff impingement syndrome as well. The findings could all relate to osteoarthritic change, however the deformity in the humeral head could relate to remote fracture.  The scapula and clavicle appear intact. The visualized ribs appear maintained. Impression:  Marked deformity in the humeral head which could relate to osteoarthritis and/or posttraumatic change. Narrowing of the subacromial space suggesting rotator cuff impingement syndrome. Electronically Signed By: Dee Marrero   on  11/16/2017  20:11    Cta Chest W Contrast    Result Date: 11/16/2017  FINAL REPORT EXAM:  CTA CHEST WITH CONTRAST HISTORY:  exertional dyspnea TECHNIQUE:  Spiral multi slice acquisition through the chest during arterial phase of contrast administration. Axial, coronal and sagittal images were reconstructed. Three-dimensional CT angiography images were generated. PRIORS:  None. FINDINGS:  There is no evidence of pulmonary embolism. The aorta and pulmonary arteries are normal in size. The cardiac chambers are not dilated. There are no signs of aortic aneurysm or dissection. There is a large left pleural effusion and there is marked atelectasis of the left lower lobe. There are no other pulmonary infiltrates. There is no mediastinal adenopathy other mediastinal masses. The visualized upper abdominal contents are unremarkable. Impression:  Negative study for pulmonary embolism. No evidence of aortic dissection. Large left pleural effusion and marked atelectasis in the left lower lobe. No other acute pulmonary findings.  Electronically Signed By: Dee Marrero   on  11/16/2017  20:07        Assessment:    Principal Problem:    Acute diastolic heart failure ([address]  Active Problems:    Essential hypertension    Von Willebrand disease ([address]    Atrial fibrillation [facility])      Patient Active Problem List    Diagnosis Date Noted    Von Willebrand disease ([address] 11/17/2017     Priority: High     Class: Chronic    Essential hypertension 11/16/2017     Priority: High    Atrial fibrillation ([address] 11/16/2017    Atrial fibrillation, new onset ([address] 11/16/2017    Acute diastolic heart failure ([address] 11/16/2017       Plan: · This patient requires inpatient admission because of acute diasolic heart failure requiring diuresis  · Factors affecting the medical complexity of this patient include afib,von willibrandt disease, pleural effusion,previous cva  · Estimated length of stay is 3 days  · Cardiology eval  · High risk medication monitoring: none    CORE MEASURES  DVT prophylaxis: no prophylaxis due to bleeding risk due to von willibrandt disease  Decubitus ulcer present on admission: No  CODE STATUS: FULL CODE  Nutrition Status: good   Physical therapy: NA   Old Charts reviewed: Yes  EKG Reviewed:  Yes  Advance Directive Addressed: Yes    Aminata De La Rosa MD  11/17/2017, 9:39 AM

## 2017-11-17 NOTE — CONSULTS
MEDICATIONS:  Outpatient Prescriptions Marked as Taking for the 11/16/17 encounter [facility] Encounter)   Medication Sig Dispense Refill    ALPRAZolam (XANAX) 0.25 MG tablet Take 0.25 mg by mouth .  albuterol sulfate  (90 Base) MCG/ACT inhaler Inhale 2 puffs into the lungs every 6 hours as needed for Wheezing      furosemide (LASIX) 20 MG tablet Take 20 mg by mouth 2 times daily      bisoprolol-hydrochlorothiazide (ZIAC) 10-6.25 MG per tablet Take 1 tablet by mouth daily      carBAMazepine (TEGRETOL-XR) 200 MG extended release tablet Take 200 mg by mouth 2 times daily      naproxen (NAPROSYN) 500 MG tablet Take 500 mg by mouth 2 times daily as needed for Pain      glimepiride (AMARYL) 4 MG tablet Take 4 mg by mouth every morning (before breakfast)         FAMILY HISTORY: Non-contributory     PHYSICAL EXAM:   /61   Pulse 74   Temp 97.7 °F (36.5 °C) (Temporal)   Resp 18   Ht 5' 5\" (1.651 m)   Wt 295 lb (133.8 kg)   SpO2 95%   BMI 49.09 kg/m²  Body mass index is 49.09 kg/m². Constitutional: She is oriented to person, place, and time. She appears well-developed and well-nourished. In no acute distress. HEENT: Normocephalic and atraumatic. No JVD present. Carotid bruit is not present. No mass and no thyromegaly present. No lymphadenopathy present. Cardiovascular: Normal rate, irregular rhythm, normal heart sounds. Exam reveals no gallop and no friction rubs. No heart murmur heard. Pulmonary/Chest: No lung sounds half way up left side of lung. Effort normal and breath sounds normal. No respiratory distress. She has no wheezes, rhonchi or rales. Abdominal: Soft, non-tender. Bowel sounds and aorta are normal. She exhibits no organomegaly, mass or bruit. Extremities: 2-3+ lower extremity pitting pedal edema above the knees bilaterally. No cyanosis and no clubbing. Pulses are 2+ radial and carotid pulses. 2+ dorsalis pedis and posterior tibial pulses bilaterally.   Neurological: She is

## 2017-11-17 NOTE — PLAN OF CARE
Problem: Falls - Risk of  Goal: Absence of falls  Outcome: Ongoing  Pulido fall score calculated on admission and with assessments and shows pt at high risk for fall. Bed in lowest position at all times with wheels locked. Bed alarm active. Patient is alert and oriented and has demonstrated the use of using the call light for assistance before getting up. Education has been provided to defer the use of the bed alarm and/or restraint free alarm as the patient has shown competency in calling for assistance and verbalizing the risk, but has been found multiple times getting up with out assistance. Falling star posted on door frame and yellow sticker visible on patient bracelet. Call light and bedside table with in reach. ID, fall and allergy band information verified as correct and visible. Will continue to monitor and intervene as necessary. Jc Oneil RN        Problem: Risk for Impaired Skin Integrity  Goal: Tissue integrity - skin and mucous membranes  Structural intactness and normal physiological function of skin and  mucous membranes. Outcome: Ongoing  Deni score calculated on admission and daily at 1400. Score shows patient not at risk for pressure ulcer. Will assist in repositioning as voiced. No new skin breakdown noted. Will continue to monitor and intervene as necessary. Jc Oneil RN        Problem: Cardiac Output - Decreased:  Goal: Hemodynamic stability will improve  Hemodynamic stability will improve   Outcome: Ongoing  Vital signs stable. No noticeable decrease in lower extremity edema. See flow sheets for lung sounds. Refuses to be compliant in elevating legs. Will continue to monitor and intervene as necessary. Jc Oneil RN

## 2017-11-17 NOTE — SEDATION DOCUMENTATION
Respirations easy. Talking to staff and to family over the phone. Vital signs stable. 900 ml of cloudy yellow/green fluid removed from left pleural space.

## 2017-11-18 ENCOUNTER — APPOINTMENT (OUTPATIENT)
Dept: GENERAL RADIOLOGY | Age: 67
DRG: 292 | End: 2017-11-18
Payer: MEDICARE

## 2017-11-18 LAB
ABSOLUTE EOS #: 0.3 K/UL (ref 0–0.4)
ABSOLUTE IMMATURE GRANULOCYTE: ABNORMAL K/UL (ref 0–0.3)
ABSOLUTE LYMPH #: 1.4 K/UL (ref 1–4.8)
ABSOLUTE MONO #: 0.5 K/UL (ref 0–1)
ALBUMIN SERPL-MCNC: 3.2 G/DL (ref 3.5–5.2)
ALBUMIN/GLOBULIN RATIO: 0.9 (ref 1–2.5)
ALP BLD-CCNC: 99 U/L (ref 35–104)
ALT SERPL-CCNC: 30 U/L (ref 5–33)
ANION GAP SERPL CALCULATED.3IONS-SCNC: 12 MMOL/L (ref 9–17)
AST SERPL-CCNC: 31 U/L
BASOPHILS # BLD: 0 %
BASOPHILS ABSOLUTE: 0 K/UL (ref 0–0.2)
BILIRUB SERPL-MCNC: 0.54 MG/DL (ref 0.3–1.2)
BUN BLDV-MCNC: 28 MG/DL (ref 8–23)
BUN/CREAT BLD: 26 (ref 9–20)
CALCIUM SERPL-MCNC: 8.8 MG/DL (ref 8.6–10.4)
CHLORIDE BLD-SCNC: 94 MMOL/L (ref 98–107)
CO2: 28 MMOL/L (ref 20–31)
CREAT SERPL-MCNC: 1.08 MG/DL (ref 0.5–0.9)
DIFFERENTIAL TYPE: ABNORMAL
EOSINOPHILS RELATIVE PERCENT: 4 %
GFR AFRICAN AMERICAN: >60 ML/MIN
GFR NON-AFRICAN AMERICAN: 50 ML/MIN
GFR SERPL CREATININE-BSD FRML MDRD: ABNORMAL ML/MIN/{1.73_M2}
GFR SERPL CREATININE-BSD FRML MDRD: ABNORMAL ML/MIN/{1.73_M2}
GLUCOSE BLD-MCNC: 140 MG/DL (ref 70–99)
GLUCOSE BLD-MCNC: 202 MG/DL (ref 74–100)
HCT VFR BLD CALC: 37.9 % (ref 36–46)
HEMOGLOBIN: 12 G/DL (ref 12–16)
IMMATURE GRANULOCYTES: ABNORMAL %
LYMPHOCYTES # BLD: 17 %
MCH RBC QN AUTO: 27.1 PG (ref 26–34)
MCHC RBC AUTO-ENTMCNC: 31.7 G/DL (ref 31–37)
MCV RBC AUTO: 85.6 FL (ref 80–100)
MONOCYTES # BLD: 6 %
PDW BLD-RTO: 16.7 % (ref 12.1–15.2)
PLATELET # BLD: 135 K/UL (ref 140–450)
PLATELET ESTIMATE: ABNORMAL
PMV BLD AUTO: 10.6 FL (ref 6–12)
POTASSIUM SERPL-SCNC: 4.4 MMOL/L (ref 3.7–5.3)
RBC # BLD: 4.43 M/UL (ref 4–5.2)
RBC # BLD: ABNORMAL 10*6/UL
SEG NEUTROPHILS: 73 %
SEGMENTED NEUTROPHILS ABSOLUTE COUNT: 6.4 K/UL (ref 1.8–7.7)
SODIUM BLD-SCNC: 134 MMOL/L (ref 135–144)
TOTAL PROTEIN: 6.8 G/DL (ref 6.4–8.3)
WBC # BLD: 8.8 K/UL (ref 3.5–11)
WBC # BLD: ABNORMAL 10*3/UL

## 2017-11-18 PROCEDURE — 36415 COLL VENOUS BLD VENIPUNCTURE: CPT

## 2017-11-18 PROCEDURE — 1200000000 HC SEMI PRIVATE

## 2017-11-18 PROCEDURE — 82947 ASSAY GLUCOSE BLOOD QUANT: CPT

## 2017-11-18 PROCEDURE — 94640 AIRWAY INHALATION TREATMENT: CPT

## 2017-11-18 PROCEDURE — 6370000000 HC RX 637 (ALT 250 FOR IP): Performed by: FAMILY MEDICINE

## 2017-11-18 PROCEDURE — 2580000003 HC RX 258: Performed by: FAMILY MEDICINE

## 2017-11-18 PROCEDURE — 6360000002 HC RX W HCPCS: Performed by: FAMILY MEDICINE

## 2017-11-18 PROCEDURE — 80053 COMPREHEN METABOLIC PANEL: CPT

## 2017-11-18 PROCEDURE — 71020 XR CHEST STANDARD TWO VW: CPT

## 2017-11-18 PROCEDURE — 85025 COMPLETE CBC W/AUTO DIFF WBC: CPT

## 2017-11-18 RX ADMIN — FAMOTIDINE 20 MG: 20 TABLET, FILM COATED ORAL at 08:09

## 2017-11-18 RX ADMIN — ACETAMINOPHEN 650 MG: 325 TABLET, FILM COATED ORAL at 17:22

## 2017-11-18 RX ADMIN — FUROSEMIDE 40 MG: 10 INJECTION, SOLUTION INTRAMUSCULAR; INTRAVENOUS at 08:12

## 2017-11-18 RX ADMIN — METOPROLOL TARTRATE 50 MG: 50 TABLET ORAL at 20:29

## 2017-11-18 RX ADMIN — CARBAMAZEPINE 200 MG: 100 TABLET, EXTENDED RELEASE ORAL at 08:09

## 2017-11-18 RX ADMIN — INSULIN LISPRO 2 UNITS: 100 INJECTION, SOLUTION INTRAVENOUS; SUBCUTANEOUS at 12:10

## 2017-11-18 RX ADMIN — METOPROLOL TARTRATE 50 MG: 50 TABLET ORAL at 08:09

## 2017-11-18 RX ADMIN — Medication 10 ML: at 08:13

## 2017-11-18 RX ADMIN — CARBAMAZEPINE 200 MG: 100 TABLET, EXTENDED RELEASE ORAL at 20:28

## 2017-11-18 RX ADMIN — SERTRALINE HYDROCHLORIDE 0.25 MG: 50 TABLET ORAL at 08:12

## 2017-11-18 RX ADMIN — INSULIN LISPRO 1 UNITS: 100 INJECTION, SOLUTION INTRAVENOUS; SUBCUTANEOUS at 20:29

## 2017-11-18 RX ADMIN — INSULIN LISPRO 1 UNITS: 100 INJECTION, SOLUTION INTRAVENOUS; SUBCUTANEOUS at 09:34

## 2017-11-18 RX ADMIN — NYSTATIN AND TRIAMCINOLONE ACETONIDE: 100000; 1 CREAM TOPICAL at 08:09

## 2017-11-18 RX ADMIN — Medication 10 ML: at 20:31

## 2017-11-18 RX ADMIN — ALBUTEROL SULFATE 2 PUFF: 90 AEROSOL, METERED RESPIRATORY (INHALATION) at 09:50

## 2017-11-18 RX ADMIN — ALBUTEROL SULFATE 2 PUFF: 90 AEROSOL, METERED RESPIRATORY (INHALATION) at 21:11

## 2017-11-18 RX ADMIN — FAMOTIDINE 20 MG: 20 TABLET, FILM COATED ORAL at 20:29

## 2017-11-18 RX ADMIN — GLIMEPIRIDE 4 MG: 4 TABLET ORAL at 08:09

## 2017-11-18 RX ADMIN — NYSTATIN AND TRIAMCINOLONE ACETONIDE: 100000; 1 CREAM TOPICAL at 20:28

## 2017-11-18 RX ADMIN — INSULIN LISPRO 1 UNITS: 100 INJECTION, SOLUTION INTRAVENOUS; SUBCUTANEOUS at 17:19

## 2017-11-18 RX ADMIN — FUROSEMIDE 40 MG: 10 INJECTION, SOLUTION INTRAMUSCULAR; INTRAVENOUS at 15:59

## 2017-11-18 ASSESSMENT — PAIN SCALES - GENERAL
PAINLEVEL_OUTOF10: 5
PAINLEVEL_OUTOF10: 5

## 2017-11-18 ASSESSMENT — PAIN DESCRIPTION - LOCATION: LOCATION: SHOULDER

## 2017-11-18 NOTE — PROGRESS NOTES
Range    WBC 10.3 3.5 - 11.0 k/uL    RBC 4.50 4.0 - 5.2 m/uL    Hemoglobin 12.4 12.0 - 16.0 g/dL    Hematocrit 38.0 36 - 46 %    MCV 84.4 80 - 100 fL    MCH 27.6 26 - 34 pg    MCHC 32.8 31 - 37 g/dL    RDW 16.7 (H) 12.1 - 15.2 %    Platelets 343 854 - 863 k/uL    MPV 9.4 6.0 - 12.0 fL    Differential Type NOT REPORTED     Seg Neutrophils 68 %    Lymphocytes 21 %    Monocytes 7 %    Eosinophils % 3 %    Basophils 1 %    Immature Granulocytes NOT REPORTED 0 %    Segs Absolute 7.00 1.8 - 7.7 k/uL    Absolute Lymph # 2.20 1.0 - 4.8 k/uL    Absolute Mono # 0.70 0.0 - 1.0 k/uL    Absolute Eos # 0.30 0.0 - 0.4 k/uL    Basophils # 0.10 0.0 - 0.2 k/uL    Absolute Immature Granulocyte NOT REPORTED 0.00 - 0.30 k/uL    WBC Morphology NOT REPORTED     RBC Morphology NOT REPORTED     Platelet Estimate NOT REPORTED    Comprehensive Metabolic Panel   Result Value Ref Range    Glucose 85 70 - 99 mg/dL    BUN 24 (H) 8 - 23 mg/dL    CREATININE 0.93 (H) 0.50 - 0.90 mg/dL    Bun/Cre Ratio 26 (H) 9 - 20    Calcium 9.0 8.6 - 10.4 mg/dL    Sodium 133 (L) 135 - 144 mmol/L    Potassium 4.8 3.7 - 5.3 mmol/L    Chloride 93 (L) 98 - 107 mmol/L    CO2 29 20 - 31 mmol/L    Anion Gap 11 9 - 17 mmol/L    Alkaline Phosphatase 100 35 - 104 U/L    ALT 18 5 - 33 U/L    AST 20 <32 U/L    Total Bilirubin 0.45 0.3 - 1.2 mg/dL    Total Protein 7.6 6.4 - 8.3 g/dL    Alb 3.7 3.5 - 5.2 g/dL    Albumin/Globulin Ratio 0.9 (L) 1.0 - 2.5    GFR Non-African American 60 (L) >60 mL/min    GFR African American >60 >60 mL/min    GFR Comment          GFR Staging         UA W/ Reflex Culture   Result Value Ref Range    Color, UA YELLOW YEL    Turbidity UA CLEAR CLEAR    Glucose, Ur NEGATIVE NEG    Bilirubin Urine NEGATIVE NEG    Ketones, Urine NEGATIVE NEG    Specific Gravity, UA 1.015 1.010 - 1.020    Urine Hgb TRACE (A) NEG    pH, UA 6.0 5.0 - 9.0    Protein, UA NEGATIVE NEG    Urobilinogen, Urine Normal NORM    Nitrite, Urine NEGATIVE NEG    Leukocyte Esterase, Urine peptide   Result Value Ref Range    Pro- (H) <300 pg/mL    BNP Interpretation         CBC auto differential   Result Value Ref Range    WBC 7.7 3.5 - 11.0 k/uL    RBC 4.29 4.0 - 5.2 m/uL    Hemoglobin 11.8 (L) 12.0 - 16.0 g/dL    Hematocrit 36.7 36 - 46 %    MCV 85.4 80 - 100 fL    MCH 27.5 26 - 34 pg    MCHC 32.2 31 - 37 g/dL    RDW 16.6 (H) 12.1 - 15.2 %    Platelets 776 (L) 245 - 450 k/uL    MPV 10.5 6.0 - 12.0 fL    Differential Type NOT REPORTED     Seg Neutrophils 74 %    Lymphocytes 18 %    Monocytes 6 %    Eosinophils % 2 %    Basophils 0 %    Immature Granulocytes NOT REPORTED 0 %    Segs Absolute 5.60 1.8 - 7.7 k/uL    Absolute Lymph # 1.40 1.0 - 4.8 k/uL    Absolute Mono # 0.50 0.0 - 1.0 k/uL    Absolute Eos # 0.20 0.0 - 0.4 k/uL    Basophils # 0.00 0.0 - 0.2 k/uL    Absolute Immature Granulocyte NOT REPORTED 0.00 - 0.30 k/uL    WBC Morphology NOT REPORTED     RBC Morphology NOT REPORTED     Platelet Estimate NOT REPORTED    Hemoglobin A1c   Result Value Ref Range    Hemoglobin A1C 6.9 (H) 4.8 - 5.9 %    Estimated Avg Glucose 151 mg/dL   Troponin   Result Value Ref Range    Troponin T <0.03 <0.03 ng/mL    Troponin Interp         Glucose, Whole Blood   Result Value Ref Range    POC Glucose 157 (H) 74 - 100 mg/dL   Glucose, Whole Blood   Result Value Ref Range    POC Glucose 131 (H) 74 - 100 mg/dL   Platelet function test   Result Value Ref Range    ZOHAIB/EPI Clos Time 195 (H) 85 - 172 sec    Collagen Adenosine-5'-Diphosphate (Adp) Time 91 67 - 112 sec    Platelet Function Interp       Pattern most often seen in drug induced platelet dysfunction, especially   Glucose, Whole Blood   Result Value Ref Range    POC Glucose 251 (H) 74 - 100 mg/dL   Glucose, Whole Blood   Result Value Ref Range    POC Glucose 275 (H) 74 - 100 mg/dL   Glucose, Whole Blood   Result Value Ref Range    POC Glucose 167 (H) 74 - 100 mg/dL   Comprehensive metabolic panel   Result Value Ref Range    Glucose 140 (H) 70 - 99 mg/dL BUN 28 (H) 8 - 23 mg/dL    CREATININE 1.08 (H) 0.50 - 0.90 mg/dL    Bun/Cre Ratio 26 (H) 9 - 20    Calcium 8.8 8.6 - 10.4 mg/dL    Sodium 134 (L) 135 - 144 mmol/L    Potassium 4.4 3.7 - 5.3 mmol/L    Chloride 94 (L) 98 - 107 mmol/L    CO2 28 20 - 31 mmol/L    Anion Gap 12 9 - 17 mmol/L    Alkaline Phosphatase 99 35 - 104 U/L    ALT 30 5 - 33 U/L    AST 31 <32 U/L    Total Bilirubin 0.54 0.3 - 1.2 mg/dL    Total Protein 6.8 6.4 - 8.3 g/dL    Alb 3.2 (L) 3.5 - 5.2 g/dL    Albumin/Globulin Ratio 0.9 (L) 1.0 - 2.5    GFR Non- 50 (L) >60 mL/min    GFR African American >60 >60 mL/min    GFR Comment          GFR Staging         CBC auto differential   Result Value Ref Range    WBC 8.8 3.5 - 11.0 k/uL    RBC 4.43 4.0 - 5.2 m/uL    Hemoglobin 12.0 12.0 - 16.0 g/dL    Hematocrit 37.9 36 - 46 %    MCV 85.6 80 - 100 fL    MCH 27.1 26 - 34 pg    MCHC 31.7 31 - 37 g/dL    RDW 16.7 (H) 12.1 - 15.2 %    Platelets 717 (L) 015 - 450 k/uL    MPV 10.6 6.0 - 12.0 fL    Differential Type NOT REPORTED     Seg Neutrophils 73 %    Lymphocytes 17 %    Monocytes 6 %    Eosinophils % 4 %    Basophils 0 %    Immature Granulocytes NOT REPORTED 0 %    Segs Absolute 6.40 1.8 - 7.7 k/uL    Absolute Lymph # 1.40 1.0 - 4.8 k/uL    Absolute Mono # 0.50 0.0 - 1.0 k/uL    Absolute Eos # 0.30 0.0 - 0.4 k/uL    Basophils # 0.00 0.0 - 0.2 k/uL    Absolute Immature Granulocyte NOT REPORTED 0.00 - 0.30 k/uL    WBC Morphology NOT REPORTED     RBC Morphology NOT REPORTED     Platelet Estimate NOT REPORTED    Glucose, Whole Blood   Result Value Ref Range    POC Glucose 181 (H) 74 - 100 mg/dL   EKG 12 Lead   Result Value Ref Range    Ventricular Rate 62 BPM    Atrial Rate 64 BPM    QRS Duration 82 ms    Q-T Interval 446 ms    QTc Calculation (Bazett) 452 ms    R Axis 71 degrees    T Axis 92 degrees       ASSESSMENT:   Patient Active Problem List    Diagnosis Date Noted    [facility] 11/17/2017     Priority: High     Class:

## 2017-11-18 NOTE — PROGRESS NOTES
Pt is a 69year old female admitted with shortness of breath. She is alert and oriented, though slow to answer and needing redirection to stay on topic. She lives at home with her son, dtr in law, dtr in [address] ex  and 2 grandchildren. She states she ambulates independently at home, though will use a rolling walker. Dtr assists with showering ever since she fell in the shower last year. She is independent with dressing. Her family does the cooking and cleaning. There is always someone home with her. She does drive, but lately family has been transporting her. She claims to manage meds well at home with family to assist.      Her PCP is Dr. Jacki Weir, she has no barriers to filling meds but is very concerned that we \"give her all d/c meds because she won't be able to get them\". Educated pt that scripts could be called into her regular pharmacy for her to  but she doesn't understand, message left for family with pt's concerns on meds. Pt plans to d/c home when able. No anticipated needs on d/c, offered home health but pt refuses saying she \"has enough help at home\".       HARLEY Carranza  11/17/2017

## 2017-11-19 VITALS
HEIGHT: 65 IN | HEART RATE: 84 BPM | BODY MASS INDEX: 47.92 KG/M2 | SYSTOLIC BLOOD PRESSURE: 139 MMHG | TEMPERATURE: 97.1 F | OXYGEN SATURATION: 92 % | RESPIRATION RATE: 18 BRPM | WEIGHT: 287.6 LBS | DIASTOLIC BLOOD PRESSURE: 82 MMHG

## 2017-11-19 LAB
ABSOLUTE EOS #: 0.2 K/UL (ref 0–0.4)
ABSOLUTE IMMATURE GRANULOCYTE: ABNORMAL K/UL (ref 0–0.3)
ABSOLUTE LYMPH #: 1.4 K/UL (ref 1–4.8)
ABSOLUTE MONO #: 0.6 K/UL (ref 0–1)
ALBUMIN SERPL-MCNC: 3 G/DL (ref 3.5–5.2)
ALBUMIN/GLOBULIN RATIO: 0.8 (ref 1–2.5)
ALP BLD-CCNC: 99 U/L (ref 35–104)
ALT SERPL-CCNC: 27 U/L (ref 5–33)
ANION GAP SERPL CALCULATED.3IONS-SCNC: 13 MMOL/L (ref 9–17)
AST SERPL-CCNC: 26 U/L
BASOPHILS # BLD: 0 %
BASOPHILS ABSOLUTE: 0 K/UL (ref 0–0.2)
BILIRUB SERPL-MCNC: 0.57 MG/DL (ref 0.3–1.2)
BUN BLDV-MCNC: 25 MG/DL (ref 8–23)
BUN/CREAT BLD: 25 (ref 9–20)
CALCIUM SERPL-MCNC: 8.8 MG/DL (ref 8.6–10.4)
CHLORIDE BLD-SCNC: 96 MMOL/L (ref 98–107)
CO2: 28 MMOL/L (ref 20–31)
CREAT SERPL-MCNC: 1.02 MG/DL (ref 0.5–0.9)
DIFFERENTIAL TYPE: ABNORMAL
EOSINOPHILS RELATIVE PERCENT: 2 %
GFR AFRICAN AMERICAN: >60 ML/MIN
GFR NON-AFRICAN AMERICAN: 54 ML/MIN
GFR SERPL CREATININE-BSD FRML MDRD: ABNORMAL ML/MIN/{1.73_M2}
GFR SERPL CREATININE-BSD FRML MDRD: ABNORMAL ML/MIN/{1.73_M2}
GLUCOSE BLD-MCNC: 132 MG/DL (ref 70–99)
GLUCOSE BLD-MCNC: 132 MG/DL (ref 74–100)
HCT VFR BLD CALC: 36.2 % (ref 36–46)
HEMOGLOBIN: 11.6 G/DL (ref 12–16)
IMMATURE GRANULOCYTES: ABNORMAL %
LYMPHOCYTES # BLD: 17 %
MCH RBC QN AUTO: 26.9 PG (ref 26–34)
MCHC RBC AUTO-ENTMCNC: 32 G/DL (ref 31–37)
MCV RBC AUTO: 84.1 FL (ref 80–100)
MONOCYTES # BLD: 8 %
PDW BLD-RTO: 16.4 % (ref 12.1–15.2)
PLATELET # BLD: 137 K/UL (ref 140–450)
PLATELET ESTIMATE: ABNORMAL
PMV BLD AUTO: 9.5 FL (ref 6–12)
POTASSIUM SERPL-SCNC: 4.1 MMOL/L (ref 3.7–5.3)
RBC # BLD: 4.31 M/UL (ref 4–5.2)
RBC # BLD: ABNORMAL 10*6/UL
SEG NEUTROPHILS: 73 %
SEGMENTED NEUTROPHILS ABSOLUTE COUNT: 6.2 K/UL (ref 1.8–7.7)
SODIUM BLD-SCNC: 137 MMOL/L (ref 135–144)
TOTAL PROTEIN: 6.7 G/DL (ref 6.4–8.3)
WBC # BLD: 8.5 K/UL (ref 3.5–11)
WBC # BLD: ABNORMAL 10*3/UL

## 2017-11-19 PROCEDURE — 85025 COMPLETE CBC W/AUTO DIFF WBC: CPT

## 2017-11-19 PROCEDURE — 36415 COLL VENOUS BLD VENIPUNCTURE: CPT

## 2017-11-19 PROCEDURE — 80053 COMPREHEN METABOLIC PANEL: CPT

## 2017-11-19 PROCEDURE — 6360000002 HC RX W HCPCS: Performed by: FAMILY MEDICINE

## 2017-11-19 PROCEDURE — 2580000003 HC RX 258: Performed by: FAMILY MEDICINE

## 2017-11-19 PROCEDURE — 82947 ASSAY GLUCOSE BLOOD QUANT: CPT

## 2017-11-19 PROCEDURE — 6370000000 HC RX 637 (ALT 250 FOR IP): Performed by: FAMILY MEDICINE

## 2017-11-19 RX ADMIN — METOPROLOL TARTRATE 50 MG: 50 TABLET ORAL at 08:55

## 2017-11-19 RX ADMIN — Medication 10 ML: at 08:56

## 2017-11-19 RX ADMIN — GLIMEPIRIDE 4 MG: 4 TABLET ORAL at 07:07

## 2017-11-19 RX ADMIN — FAMOTIDINE 20 MG: 20 TABLET, FILM COATED ORAL at 08:55

## 2017-11-19 RX ADMIN — SERTRALINE HYDROCHLORIDE 0.25 MG: 50 TABLET ORAL at 07:07

## 2017-11-19 RX ADMIN — CARBAMAZEPINE 200 MG: 100 TABLET, EXTENDED RELEASE ORAL at 08:55

## 2017-11-19 RX ADMIN — FUROSEMIDE 40 MG: 10 INJECTION, SOLUTION INTRAMUSCULAR; INTRAVENOUS at 07:07

## 2017-11-19 RX ADMIN — NYSTATIN AND TRIAMCINOLONE ACETONIDE: 100000; 1 CREAM TOPICAL at 08:56

## 2017-11-19 ASSESSMENT — PAIN SCALES - GENERAL: PAINLEVEL_OUTOF10: 0

## 2017-11-19 NOTE — PLAN OF CARE
Problem: Falls - Risk of  Goal: Absence of falls  Outcome: Ongoing  Fall assessment completed daily. Bed in lowest position. Call light within reach. Falling star posted to outside of door. Fall risk sticker in place on ID band. Side rails up 2/4. Bed alarm on for safety. Patient ambulates fairly well with walker. Will continue to monitor. Problem: Risk for Impaired Skin Integrity  Goal: Tissue integrity - skin and mucous membranes  Structural intactness and normal physiological function of skin and  mucous membranes. Outcome: Ongoing  Skin assessment performed, no breakdown noted at this time. Patient skin is dry and intact. Will continue to monitor for redness or breakdown. Problem: Cardiac Output - Decreased:  Goal: Hemodynamic stability will improve  Hemodynamic stability will improve   Outcome: Ongoing  Vitals:    11/18/17 2257   BP: (!) 147/67   Pulse: 82   Resp: 16   Temp: 97.3 °F (36.3 °C)   SpO2: 96%         Problem: Pain:  Goal: Pain level will decrease  Pain level will decrease   Outcome: Ongoing  Pain assessed every 4 hours. Patient is able to communicate with staff the need for pain interventions. Patient currently complains of chronic pain to shoulder. No required medication intervention. Will continue to monitor.

## 2017-11-19 NOTE — PROGRESS NOTES
Progress Note    SUBJECTIVE: Patient is seen for Principal Problem:    Acute diastolic heart failure ([facility]  Active Problems:    Essential hypertension    Von Willebrand disease ([facility]    [facility]     pt improved,wants to go home    OBJECTIVE:    Vitals:   Vitals:    11/19/17 0715   BP: 139/82   Pulse: 84   Resp: 18   Temp: 97.1 °F (36.2 °C)   SpO2: 92%     Weight: 287 lb 9.6 oz (130.5 kg)   Height: 5' 5\" (165.1 cm)   -----------------------------------------------------------------  Exam:    CONSTITUTIONAL:  awake, alert, cooperative, no apparent distress, and appears stated age  EYES:  Lids and lashes normal, pupils equal, round and reactive to light, extra ocular muscles intact, sclera clear, conjunctiva normal  ENT:  Normocephalic, without obvious abnormality, atraumatic, sinuses nontender on palpation, external ears without lesions, oral pharynx with moist mucus membranes, tonsils without erythema or exudates, gums normal and good dentition. NECK:  Supple, symmetrical, trachea midline, no adenopathy, thyroid symmetric, not enlarged and no tenderness, skin normal  HEMATOLOGIC/LYMPHATICS:  no cervical lymphadenopathy  LUNGS:  Diminished air entry lt base  CARDIOVASCULAR:  S1,S2, irregularly irregular,rate well comtrolled  ABDOMEN:  Soft,non tander    MUSCULOSKELETAL:  there is no redness, warmth, or swelling of the joints  NEUROLOGIC:  Awake, alert, oriented to name, place and time. Cranial nerves II-XII are grossly intact. Motor is 5 out of 5 bilaterally. Cerebellar finger to nose, heel to shin intact. Sensory is intact.   Babinski down going, Romberg negative, and gait is normal.  SKIN:  Skin infection better  EXT:     Minimal edema of legs  -----------------------------------------------------------------  Diagnostic Data: Reviewed    More Recent Labs:    Results for orders placed or performed during the hospital encounter of 11/16/17   CBC Auto Differential   Result Value Ref Range    WBC 10.3 3.5 - 11.0 k/uL    RBC 4.50 4.0 - 5.2 m/uL    Hemoglobin 12.4 12.0 - 16.0 g/dL    Hematocrit 38.0 36 - 46 %    MCV 84.4 80 - 100 fL    MCH 27.6 26 - 34 pg    MCHC 32.8 31 - 37 g/dL    RDW 16.7 (H) 12.1 - 15.2 %    Platelets 268 878 - 007 k/uL    MPV 9.4 6.0 - 12.0 fL    Differential Type NOT REPORTED     Seg Neutrophils 68 %    Lymphocytes 21 %    Monocytes 7 %    Eosinophils % 3 %    Basophils 1 %    Immature Granulocytes NOT REPORTED 0 %    Segs Absolute 7.00 1.8 - 7.7 k/uL    Absolute Lymph # 2.20 1.0 - 4.8 k/uL    Absolute Mono # 0.70 0.0 - 1.0 k/uL    Absolute Eos # 0.30 0.0 - 0.4 k/uL    Basophils # 0.10 0.0 - 0.2 k/uL    Absolute Immature Granulocyte NOT REPORTED 0.00 - 0.30 k/uL    WBC Morphology NOT REPORTED     RBC Morphology NOT REPORTED     Platelet Estimate NOT REPORTED    Comprehensive Metabolic Panel   Result Value Ref Range    Glucose 85 70 - 99 mg/dL    BUN 24 (H) 8 - 23 mg/dL    CREATININE 0.93 (H) 0.50 - 0.90 mg/dL    Bun/Cre Ratio 26 (H) 9 - 20    Calcium 9.0 8.6 - 10.4 mg/dL    Sodium 133 (L) 135 - 144 mmol/L    Potassium 4.8 3.7 - 5.3 mmol/L    Chloride 93 (L) 98 - 107 mmol/L    CO2 29 20 - 31 mmol/L    Anion Gap 11 9 - 17 mmol/L    Alkaline Phosphatase 100 35 - 104 U/L    ALT 18 5 - 33 U/L    AST 20 <32 U/L    Total Bilirubin 0.45 0.3 - 1.2 mg/dL    Total Protein 7.6 6.4 - 8.3 g/dL    Alb 3.7 3.5 - 5.2 g/dL    Albumin/Globulin Ratio 0.9 (L) 1.0 - 2.5    GFR Non-African American 60 (L) >60 mL/min    GFR African American >60 >60 mL/min    GFR Comment          GFR Staging         UA W/ Reflex Culture   Result Value Ref Range    Color, UA YELLOW YEL    Turbidity UA CLEAR CLEAR    Glucose, Ur NEGATIVE NEG    Bilirubin Urine NEGATIVE NEG    Ketones, Urine NEGATIVE NEG    Specific Gravity, UA 1.015 1.010 - 1.020    Urine Hgb TRACE (A) NEG    pH, UA 6.0 5.0 - 9.0    Protein, UA NEGATIVE NEG    Urobilinogen, Urine Normal NORM    Nitrite, Urine NEGATIVE NEG    Leukocyte Esterase, Urine NEGATIVE NEG Result Value Ref Range    Pro- (H) <300 pg/mL    BNP Interpretation         CBC auto differential   Result Value Ref Range    WBC 7.7 3.5 - 11.0 k/uL    RBC 4.29 4.0 - 5.2 m/uL    Hemoglobin 11.8 (L) 12.0 - 16.0 g/dL    Hematocrit 36.7 36 - 46 %    MCV 85.4 80 - 100 fL    MCH 27.5 26 - 34 pg    MCHC 32.2 31 - 37 g/dL    RDW 16.6 (H) 12.1 - 15.2 %    Platelets 083 (L) 702 - 450 k/uL    MPV 10.5 6.0 - 12.0 fL    Differential Type NOT REPORTED     Seg Neutrophils 74 %    Lymphocytes 18 %    Monocytes 6 %    Eosinophils % 2 %    Basophils 0 %    Immature Granulocytes NOT REPORTED 0 %    Segs Absolute 5.60 1.8 - 7.7 k/uL    Absolute Lymph # 1.40 1.0 - 4.8 k/uL    Absolute Mono # 0.50 0.0 - 1.0 k/uL    Absolute Eos # 0.20 0.0 - 0.4 k/uL    Basophils # 0.00 0.0 - 0.2 k/uL    Absolute Immature Granulocyte NOT REPORTED 0.00 - 0.30 k/uL    WBC Morphology NOT REPORTED     RBC Morphology NOT REPORTED     Platelet Estimate NOT REPORTED    Hemoglobin A1c   Result Value Ref Range    Hemoglobin A1C 6.9 (H) 4.8 - 5.9 %    Estimated Avg Glucose 151 mg/dL   Troponin   Result Value Ref Range    Troponin T <0.03 <0.03 ng/mL    Troponin Interp         Glucose, Whole Blood   Result Value Ref Range    POC Glucose 157 (H) 74 - 100 mg/dL   Glucose, Whole Blood   Result Value Ref Range    POC Glucose 131 (H) 74 - 100 mg/dL   Platelet function test   Result Value Ref Range    ZOHAIB/EPI Clos Time 195 (H) 85 - 172 sec    Collagen Adenosine-5'-Diphosphate (Adp) Time 91 67 - 112 sec    Platelet Function Interp       Pattern most often seen in drug induced platelet dysfunction, especially   Glucose, Whole Blood   Result Value Ref Range    POC Glucose 251 (H) 74 - 100 mg/dL   Glucose, Whole Blood   Result Value Ref Range    POC Glucose 275 (H) 74 - 100 mg/dL   Glucose, Whole Blood   Result Value Ref Range    POC Glucose 167 (H) 74 - 100 mg/dL   Comprehensive metabolic panel   Result Value Ref Range    Glucose 140 (H) 70 - 99 mg/dL    BUN 28 17 mmol/L    Alkaline Phosphatase 99 35 - 104 U/L    ALT 27 5 - 33 U/L    AST 26 <32 U/L    Total Bilirubin 0.57 0.3 - 1.2 mg/dL    Total Protein 6.7 6.4 - 8.3 g/dL    Alb 3.0 (L) 3.5 - 5.2 g/dL    Albumin/Globulin Ratio 0.8 (L) 1.0 - 2.5    GFR Non- 54 (L) >60 mL/min    GFR African American >60 >60 mL/min    GFR Comment          GFR Staging         CBC auto differential   Result Value Ref Range    WBC 8.5 3.5 - 11.0 k/uL    RBC 4.31 4.0 - 5.2 m/uL    Hemoglobin 11.6 (L) 12.0 - 16.0 g/dL    Hematocrit 36.2 36 - 46 %    MCV 84.1 80 - 100 fL    MCH 26.9 26 - 34 pg    MCHC 32.0 31 - 37 g/dL    RDW 16.4 (H) 12.1 - 15.2 %    Platelets 658 (L) 856 - 450 k/uL    MPV 9.5 6.0 - 12.0 fL    Differential Type NOT REPORTED     Seg Neutrophils 73 %    Lymphocytes 17 %    Monocytes 8 %    Eosinophils % 2 %    Basophils 0 %    Immature Granulocytes NOT REPORTED 0 %    Segs Absolute 6.20 1.8 - 7.7 k/uL    Absolute Lymph # 1.40 1.0 - 4.8 k/uL    Absolute Mono # 0.60 0.0 - 1.0 k/uL    Absolute Eos # 0.20 0.0 - 0.4 k/uL    Basophils # 0.00 0.0 - 0.2 k/uL    Absolute Immature Granulocyte NOT REPORTED 0.00 - 0.30 k/uL    WBC Morphology NOT REPORTED     RBC Morphology NOT REPORTED     Platelet Estimate NOT REPORTED    Glucose, Whole Blood   Result Value Ref Range    POC Glucose 202 (H) 74 - 100 mg/dL   Glucose, Whole Blood   Result Value Ref Range    POC Glucose 132 (H) 74 - 100 mg/dL   EKG 12 Lead   Result Value Ref Range    Ventricular Rate 62 BPM    Atrial Rate 64 BPM    QRS Duration 82 ms    Q-T Interval 446 ms    QTc Calculation (Bazett) 452 ms    R Axis 71 degrees    T Axis 92 degrees       ASSESSMENT:   Patient Active Problem List    Diagnosis Date Noted    [facility] 11/17/2017     Priority: High     Class: Chronic    Essential hypertension 11/16/2017     Priority: High    Atrial fibrillation ([facility] 11/16/2017    New onset a-fib ([facility] 11/16/2017    Acute diastolic heart failure ([facility]) 11/16/2017         PLAN:  · D/c home today on oral diuretics  · F/u with pcp,cardiology and hematology as out pt      Justine Bryan M.D.

## 2017-11-19 NOTE — PLAN OF CARE
Problem: Falls - Risk of  Goal: Absence of falls  Outcome: Ongoing  Patient encouraged to use safe transfers and to call for help if needed to prevent a fall. Problem: Risk for Impaired Skin Integrity  Goal: Tissue integrity - skin and mucous membranes  Structural intactness and normal physiological function of skin and  mucous membranes. Outcome: Ongoing  Patient assisted in repositioning in bed. Pressure ulcer devices used in assisting to prevent skin breakdown if needed. Will continue to monitor for any problems. Problem: Cardiac Output - Decreased:  Goal: Hemodynamic stability will improve  Hemodynamic stability will improve   Outcome: Ongoing  Monitoring patients heart rate and blood pressure. Cardiac rhythm  is WNL . MEWS is WNL. Will continue to monitor. Problem: Pain:  Goal: Pain level will decrease  Pain level will decrease   Outcome: Ongoing  Pain being monitored . Patient encouraged to use pain scale when rating pain. Patient using pain medications and non medication techniques to relieve pain. Problem: Nutrition  Goal: Optimal nutrition therapy  Outcome: Ongoing  Patient able to eat without help. Eating more than 50% of meals. Will continue to monitor dietary intake.

## 2017-11-19 NOTE — DISCHARGE SUMMARY
EXAM:  XR CHEST STANDARD TWO VW HISTORY:  pleural effusion recheck fluid level TECHNIQUE:  Frontal and lateral views of the chest. PRIORS:  Chest x-ray November 16, 2017. FINDINGS:  Mild cardiomegaly is stable. Moderate left pleural effusion with adjacent area of subsegmental atelectasis and or infiltrate is relatively unchanged compared to prior. No right pleural effusion. Prominent central pulmonary markings suggest pulmonary vascular congestion. No pneumothorax. There are no suspicious osseous lesions. Impression:  Stable mild cardiomegaly. Suspect pulmonary vascular congestion possible edema. Similar or slightly better compared to the prior. Moderate left pleural effusion with adjacent area of focal subsegmental atelectasis and or infiltrate is unchanged. Electronically Signed By: Dakota Black   on  11/18/2017  09:50    Xr Chest Standard (2 Vw)    Result Date: 11/16/2017  FINAL REPORT EXAM:  XR CHEST STANDARD TWO VW HISTORY:  Chest Pain TECHNIQUE:  PA and lateral chest PRIORS:  None. FINDINGS:  The heart and vascularity are normal. There is a large left pleural effusion present. The right lung appears well expanded. There are no other pulmonary infiltrates or masses seen. The osseous structures are grossly maintained. Impression:  Large left pleural effusion. No other acute pulmonary findings. Electronically Signed By: Wash Councilman   on  11/16/2017  20:09    Xr Shoulder Right (min 2 Views)    Result Date: 11/16/2017  FINAL REPORT EXAM:  XR SHOULDER RIGHT STANDARD HISTORY:  R shoulder pain TECHNIQUE:  Four views of the right shoulder were obtained. PRIORS:  None. FINDINGS:  Marked arthritic changes seen in the right shoulder with marked deformity in the humeral head. There is marked narrowing of the subacromial space which is typical of rotator cuff impingement syndrome as well.  The findings could all relate to osteoarthritic change, however the deformity in the humeral head could relate to remote fracture. The scapula and clavicle appear intact. The visualized ribs appear maintained. Impression:  Marked deformity in the humeral head which could relate to osteoarthritis and/or posttraumatic change. Narrowing of the subacromial space suggesting rotator cuff impingement syndrome. Electronically Signed By: Wendy Thornton   on  11/16/2017  20:11    Cta Chest W Contrast    Result Date: 11/16/2017  FINAL REPORT EXAM:  CTA CHEST WITH CONTRAST HISTORY:  exertional dyspnea TECHNIQUE:  Spiral multi slice acquisition through the chest during arterial phase of contrast administration. Axial, coronal and sagittal images were reconstructed. Three-dimensional CT angiography images were generated. PRIORS:  None. FINDINGS:  There is no evidence of pulmonary embolism. The aorta and pulmonary arteries are normal in size. The cardiac chambers are not dilated. There are no signs of aortic aneurysm or dissection. There is a large left pleural effusion and there is marked atelectasis of the left lower lobe. There are no other pulmonary infiltrates. There is no mediastinal adenopathy other mediastinal masses. The visualized upper abdominal contents are unremarkable. Impression:  Negative study for pulmonary embolism. No evidence of aortic dissection. Large left pleural effusion and marked atelectasis in the left lower lobe. No other acute pulmonary findings. Electronically Signed By: Wendy Thornton   on  11/16/2017  20:07    Us Thoracentesis    Result Date: 11/17/2017  ULTRASOUND-GUIDED THORACENTESIS INDICATION: Left pleural effusion PROCEDURE: After informed consent was obtained and a timeout performed, the patient's left chest was prepped and draped in the typical sterile fashion. Adequate local anesthesia to the subcutaneous tissues obtained with 2 % lidocaine. Utilizing ultrasound guidance, a 5 [employer] [place] Yueh catheter was advanced into the pleural space and the catheter deployed.  The catheter was then attached to vacuum suction. 900 mL of dark serosanguineous fluid was removed. The catheter was removed and hemostasis obtained with direct pressure. Postprocedure images reveal no significant pleural effusion remaining. The access site was sterilely bandaged. Patient tolerated the procedure well without immediate complication. Final report electronically signed by Ugo Catherine on 11/17/2017 3:46 PM    Successful ultrasound-guided thoracentesis with removal of 900 mL of fluid. Patient had expected pleuritic chest pain postprocedure and was given a dose of toward all for comfort.     Recent Results (from the past 96 hour(s))   EKG 12 Lead    Collection Time: 11/16/17  5:12 PM   Result Value Ref Range    Ventricular Rate 62 BPM    Atrial Rate 64 BPM    QRS Duration 82 ms    Q-T Interval 446 ms    QTc Calculation (Bazett) 452 ms    R Axis 71 degrees    T Axis 92 degrees   UA W/ Reflex Culture    Collection Time: 11/16/17  5:40 PM   Result Value Ref Range    Color, UA YELLOW YEL    Turbidity UA CLEAR CLEAR    Glucose, Ur NEGATIVE NEG    Bilirubin Urine NEGATIVE NEG    Ketones, Urine NEGATIVE NEG    Specific Gravity, UA 1.015 1.010 - 1.020    Urine Hgb TRACE (A) NEG    pH, UA 6.0 5.0 - 9.0    Protein, UA NEGATIVE NEG    Urobilinogen, Urine Normal NORM    Nitrite, Urine NEGATIVE NEG    Leukocyte Esterase, Urine NEGATIVE NEG    Urinalysis Comments NOT REPORTED    Microscopic Urinalysis    Collection Time: 11/16/17  5:40 PM   Result Value Ref Range    -          WBC, UA 0 TO 2 0 - 5 /HPF    RBC, UA 0 TO 2 0 - 2 /HPF    Casts UA HYALINE /LPF    Crystals UA NOT REPORTED NONE /HPF    Epithelial Cells UA 0 TO 2 0 - 25 /HPF    Renal Epithelial, Urine NOT REPORTED 0 /HPF    Bacteria, UA TRACE (A) NONE    Mucus, UA NOT REPORTED NONE    Trichomonas, UA NOT REPORTED NONE    Amorphous, UA NOT REPORTED NONE    Other Observations UA NOT REPORTED NREQ    Yeast, UA NOT REPORTED NONE   CBC Auto Differential    Collection Time: 11/16/17  5:55 PM   Result Value Ref Range    WBC 10.3 3.5 - 11.0 k/uL    RBC 4.50 4.0 - 5.2 m/uL    Hemoglobin 12.4 12.0 - 16.0 g/dL    Hematocrit 38.0 36 - 46 %    MCV 84.4 80 - 100 fL    MCH 27.6 26 - 34 pg    MCHC 32.8 31 - 37 g/dL    RDW 16.7 (H) 12.1 - 15.2 %    Platelets 023 525 - 331 k/uL    MPV 9.4 6.0 - 12.0 fL    Differential Type NOT REPORTED     Seg Neutrophils 68 %    Lymphocytes 21 %    Monocytes 7 %    Eosinophils % 3 %    Basophils 1 %    Immature Granulocytes NOT REPORTED 0 %    Segs Absolute 7.00 1.8 - 7.7 k/uL    Absolute Lymph # 2.20 1.0 - 4.8 k/uL    Absolute Mono # 0.70 0.0 - 1.0 k/uL    Absolute Eos # 0.30 0.0 - 0.4 k/uL    Basophils # 0.10 0.0 - 0.2 k/uL    Absolute Immature Granulocyte NOT REPORTED 0.00 - 0.30 k/uL    WBC Morphology NOT REPORTED     RBC Morphology NOT REPORTED     Platelet Estimate NOT REPORTED    Comprehensive Metabolic Panel    Collection Time: 11/16/17  5:55 PM   Result Value Ref Range    Glucose 85 70 - 99 mg/dL    BUN 24 (H) 8 - 23 mg/dL    CREATININE 0.93 (H) 0.50 - 0.90 mg/dL    Bun/Cre Ratio 26 (H) 9 - 20    Calcium 9.0 8.6 - 10.4 mg/dL    Sodium 133 (L) 135 - 144 mmol/L    Potassium 4.8 3.7 - 5.3 mmol/L    Chloride 93 (L) 98 - 107 mmol/L    CO2 29 20 - 31 mmol/L    Anion Gap 11 9 - 17 mmol/L    Alkaline Phosphatase 100 35 - 104 U/L    ALT 18 5 - 33 U/L    AST 20 <32 U/L    Total Bilirubin 0.45 0.3 - 1.2 mg/dL    Total Protein 7.6 6.4 - 8.3 g/dL    Alb 3.7 3.5 - 5.2 g/dL    Albumin/Globulin Ratio 0.9 (L) 1.0 - 2.5    GFR Non-African American 60 (L) >60 mL/min    GFR African American >60 >60 mL/min    GFR Comment          GFR Staging         Troponin    Collection Time: 11/16/17  5:55 PM   Result Value Ref Range    Troponin T <0.03 <0.03 ng/mL    Troponin Interp         Carbamazepine level, total    Collection Time: 11/16/17  5:55 PM   Result Value Ref Range    Carbamazepine Lvl 7.7 (L) 8.0 - 12.0 ug/mL    Carbamazepine Dose Amount NOT REPORTED     Carbamazepine Date Last Dose NOT REPORTED (H) 9 - 20    Calcium 8.8 8.6 - 10.4 mg/dL    Sodium 134 (L) 135 - 144 mmol/L    Potassium 4.4 3.7 - 5.3 mmol/L    Chloride 94 (L) 98 - 107 mmol/L    CO2 28 20 - 31 mmol/L    Anion Gap 12 9 - 17 mmol/L    Alkaline Phosphatase 99 35 - 104 U/L    ALT 30 5 - 33 U/L    AST 31 <32 U/L    Total Bilirubin 0.54 0.3 - 1.2 mg/dL    Total Protein 6.8 6.4 - 8.3 g/dL    Alb 3.2 (L) 3.5 - 5.2 g/dL    Albumin/Globulin Ratio 0.9 (L) 1.0 - 2.5    GFR Non- 50 (L) >60 mL/min    GFR African American >60 >60 mL/min    GFR Comment          GFR Staging         CBC auto differential    Collection Time: 11/18/17  5:53 AM   Result Value Ref Range    WBC 8.8 3.5 - 11.0 k/uL    RBC 4.43 4.0 - 5.2 m/uL    Hemoglobin 12.0 12.0 - 16.0 g/dL    Hematocrit 37.9 36 - 46 %    MCV 85.6 80 - 100 fL    MCH 27.1 26 - 34 pg    MCHC 31.7 31 - 37 g/dL    RDW 16.7 (H) 12.1 - 15.2 %    Platelets 048 (L) 457 - 450 k/uL    MPV 10.6 6.0 - 12.0 fL    Differential Type NOT REPORTED     Seg Neutrophils 73 %    Lymphocytes 17 %    Monocytes 6 %    Eosinophils % 4 %    Basophils 0 %    Immature Granulocytes NOT REPORTED 0 %    Segs Absolute 6.40 1.8 - 7.7 k/uL    Absolute Lymph # 1.40 1.0 - 4.8 k/uL    Absolute Mono # 0.50 0.0 - 1.0 k/uL    Absolute Eos # 0.30 0.0 - 0.4 k/uL    Basophils # 0.00 0.0 - 0.2 k/uL    Absolute Immature Granulocyte NOT REPORTED 0.00 - 0.30 k/uL    WBC Morphology NOT REPORTED     RBC Morphology NOT REPORTED     Platelet Estimate NOT REPORTED    Glucose, Whole Blood    Collection Time: 11/18/17  8:09 PM   Result Value Ref Range    POC Glucose 202 (H) 74 - 100 mg/dL   Comprehensive metabolic panel    Collection Time: 11/19/17  6:14 AM   Result Value Ref Range    Glucose 132 (H) 70 - 99 mg/dL    BUN 25 (H) 8 - 23 mg/dL    CREATININE 1.02 (H) 0.50 - 0.90 mg/dL    Bun/Cre Ratio 25 (H) 9 - 20    Calcium 8.8 8.6 - 10.4 mg/dL    Sodium 137 135 - 144 mmol/L    Potassium 4.1 3.7 - 5.3 mmol/L    Chloride 96 (L) 98 - 107 mmol/L    CO2 28 by mouth daily             carBAMazepine (TEGRETOL-XR) 200 MG extended release tablet  Take 200 mg by mouth 2 times daily             furosemide (LASIX) 20 MG tablet  Take 20 mg by mouth 2 times daily             glimepiride (AMARYL) 4 MG tablet  Take 4 mg by mouth every morning (before breakfast)             nystatin-triamcinolone (MYCOLOG II) 446620-9.1 UNIT/GM-% cream  Apply topically 4 times daily. · Resume all home medications unless otherwise directed    Increase lasix to 40 mg daily  Stop NSAIDS  Patient Instructions:   · Activity: activity as tolerated  · Diet: low salt  · Wound Care: none needed  · Other:   · Follow up with Dr Kasey Pelletier and hematologist as out pt as directed      Time Spent on discharge services is 40 minutes in the examination, evaluation, counseling and review of medications and discharge plan.     Signed:  Roberto Curry M.D.  11/19/2017  10:52 AM

## 2017-11-21 LAB
RISTOCETIN CO-FACTOR: 309 % (ref 51–215)
VON WILLEBRAND AG: 318 % (ref 50–160)

## 2017-12-01 LAB
GLUCOSE BLD-MCNC: 140 MG/DL (ref 74–100)
GLUCOSE BLD-MCNC: 148 MG/DL (ref 74–100)
GLUCOSE BLD-MCNC: 218 MG/DL (ref 74–100)
GLUCOSE BLD-MCNC: 235 MG/DL (ref 74–100)

## 2020-11-03 PROBLEM — I48.91 ATRIAL FIBRILLATION (HCC): Status: RESOLVED | Noted: 2017-11-16 | Resolved: 2020-11-03
